# Patient Record
Sex: MALE | Race: WHITE | Employment: OTHER | URBAN - NONMETROPOLITAN AREA
[De-identification: names, ages, dates, MRNs, and addresses within clinical notes are randomized per-mention and may not be internally consistent; named-entity substitution may affect disease eponyms.]

---

## 2019-04-24 ENCOUNTER — HOSPITAL ENCOUNTER (OUTPATIENT)
Age: 69
Discharge: HOME | End: 2019-04-26
Payer: MEDICARE

## 2019-04-24 ENCOUNTER — HOSPITAL ENCOUNTER (OUTPATIENT)
Dept: GENERAL RADIOLOGY | Age: 69
Discharge: HOME | End: 2019-04-26
Payer: MEDICARE

## 2019-04-24 DIAGNOSIS — M25.562 LEFT KNEE PAIN, UNSPECIFIED CHRONICITY: ICD-10-CM

## 2019-04-24 PROCEDURE — 73562 X-RAY EXAM OF KNEE 3: CPT

## 2019-12-11 ENCOUNTER — HOSPITAL ENCOUNTER (OUTPATIENT)
Age: 69
Discharge: HOME | End: 2019-12-13
Payer: MEDICARE

## 2019-12-11 ENCOUNTER — HOSPITAL ENCOUNTER (OUTPATIENT)
Dept: GENERAL RADIOLOGY | Age: 69
Discharge: HOME | End: 2019-12-13
Payer: MEDICARE

## 2019-12-11 DIAGNOSIS — M25.562 LEFT KNEE PAIN, UNSPECIFIED CHRONICITY: ICD-10-CM

## 2019-12-11 PROCEDURE — 73564 X-RAY EXAM KNEE 4 OR MORE: CPT

## 2022-07-20 ENCOUNTER — HOSPITAL ENCOUNTER (OUTPATIENT)
Dept: GENERAL RADIOLOGY | Age: 72
Discharge: HOME | End: 2022-07-22
Payer: MEDICARE

## 2022-07-20 ENCOUNTER — HOSPITAL ENCOUNTER (OUTPATIENT)
Age: 72
Discharge: HOME | End: 2022-07-22
Payer: MEDICARE

## 2022-07-20 DIAGNOSIS — M17.12 OSTEOARTHRITIS OF LEFT KNEE, UNSPECIFIED OSTEOARTHRITIS TYPE: ICD-10-CM

## 2022-07-20 PROCEDURE — 73564 X-RAY EXAM KNEE 4 OR MORE: CPT

## 2022-10-07 ENCOUNTER — HOSPITAL ENCOUNTER (OUTPATIENT)
Dept: PHYSICAL THERAPY | Age: 72
Setting detail: THERAPIES SERIES
Discharge: HOME | End: 2022-10-07
Payer: MEDICARE

## 2022-10-07 PROCEDURE — 97110 THERAPEUTIC EXERCISES: CPT

## 2022-10-07 PROCEDURE — 97162 PT EVAL MOD COMPLEX 30 MIN: CPT

## 2022-10-07 NOTE — PLAN OF CARE
[facility]       Phone: [phone]   Date: 10/7/2022                      Outpatient Physical Therapy  Fax: [phone]    ACCT #: [de-identified]                     Plan of Care  [facility]#: [id number]  Patient: Kelsey Reyes  : 1950    Referring Provider (secondary): Dr Lit Saucedo    Diagnosis: L TKR  Onset Date: 22  Treatment Diagnosis: L knee pain s/p TKR    Assessment  Body Structures, Functions, Activity Limitations Requiring Skilled Therapeutic Intervention: Decreased functional mobility , Decreased ADL status, Decreased ROM, Decreased strength, Decreased endurance, Decreased balance, Increased pain, Decreased posture, Decreased high-level IADLs  Assessment: Pt to benefit from ther ex for ROM and LE strengthening, neuro re ed for balance training, as well as gait training. Pt AROM 4-98deg this date. Therapy Prognosis: Good    Treatment Plan   Days: 3 times per week Weeks: 6 weeks Total # of Visits Approved: 18    Patient Education/HEP, Therapeutic Exercise, Manual Therapy: Myofacial Release/Cupping, Manual Therapy: Mobilization/Manipulation, Neuro Re-ed, and Gait Training     Goals  Short Term Goals  Time Frame for Short Term Goals: 6 visits  Short Term Goal 1: Pt to report independence and compliance with HEP for ROM  Short Term Goal 2: Pt to have PROM knee ext 0deg to improve standing andrea. Long Term Goals  Time Frame for Long Term Goals : 18 visits  Long Term Goal 1: Pt to improve LEFS score from 33/80 to >43/80 to improve ADL andrea. Long Term Goal 2: Pt to have >110deg PROM to improve car transfers  Long Term Goal 3: Pt to amb >100ft without AD and no deviations to improve community amb andrea. Long Term Goal 4: Pt to have 4/5 Hip ABD strength to improve walking and standing andrea.      Renzo Huggins, PT   Date: 10/7/2022    ______________________________________ Date: 10/7/2022  Physician Signature  By signing above or cosigning electronically, I have reviewed this Plan of Care and certify a need for medically necessary rehabilitation services.

## 2022-10-07 NOTE — PROGRESS NOTES
Phone: [phone]         Fax: [phone]                      Outpatient Physical Therapy                                                                      Evaluation    Date: 10/7/2022  Patient: Zackery Chavez  : 1950  ACCT #: [de-identified]    Referring Provider (secondary): Dr Rafael Quiles    Diagnosis: L TKR    Treatment Diagnosis: L knee pain s/p TKR  Onset Date: 22  PT Insurance Information: [facility]  Total # of Visits Approved: 18 Per Physician Order  Total # of Visits to Date: 1  No Show: 0  Canceled Appointment: 0     Subjective  Additional Pertinent Hx: Pt reports undergoing L TKR on 22, same day surgery. No complications. Pt did have small opening at distal incision, he had called and talked to Dr. Perez Weber. Pt is on antibiotics x7day(2x/day) and today is 2nd day and reports wound is improving. Taking only extra strength tylenol, no issues with sleeping. 4/10 pain at worst.    Objective  Observation/Palpation  Posture: Fair  Observation: Incision areas visualized well healed, mild pitting edema. Strength LLE  L Hip Flexion: 4/5  L Hip Extension: 4/5  L Hip ABduction: 3+/5  L Knee Extension: 4-/5  L Ankle Dorsiflexion: 5/5  AROM LLE (degrees)  L Knee Flexion 0-145: 98deg  L Knee Extension 0: 4deg FN after quad set  L Ankle Dorsiflexion 0-20: 5deg     AROM LLE (degrees)  L Knee Flexion 0-145: 98deg  L Knee Extension 0: 4deg FN after quad set  L Ankle Dorsiflexion 0-20: 5deg     Assessment  Body Structures, Functions, Activity Limitations Requiring Skilled Therapeutic Intervention: Decreased functional mobility , Decreased ADL status, Decreased ROM, Decreased strength, Decreased endurance, Decreased balance, Increased pain, Decreased posture, Decreased high-level IADLs  Assessment: Pt to benefit from ther ex for ROM and LE strengthening, neuro re ed for balance training, as well as gait training. Pt AROM 4-98deg this date.   Therapy Prognosis: Good    Clinical Presentation:  Evolving  The Following Comorbities will impact the patients progression and Plan of Care:   Cardiac Disease/Pacemaker, Diabetes, and Previous Orthopedic Injury/Surgery  Medium Complexity    Education: POC; Therex - vc's for form with step taps to avoid circumduction    Goals  Short Term Goals  Time Frame for Short Term Goals: 6 visits  Short Term Goal 1: Pt to report independence and compliance with HEP for ROM  Short Term Goal 2: Pt to have PROM knee ext 0deg to improve standing andrea. Long Term Goals  Time Frame for Long Term Goals : 18 visits  Long Term Goal 1: Pt to improve LEFS score from 33/80 to >43/80 to improve ADL andrea. Long Term Goal 2: Pt to have >110deg PROM to improve car transfers  Long Term Goal 3: Pt to amb >100ft without AD and no deviations to improve community amb andrea. Long Term Goal 4: Pt to have 4/5 Hip ABD strength to improve walking and standing andrea.     Patient's Goal:  Patient Goals : Be able to walk without AD and without limp    Timed Code Treatment Minutes: 10 Minutes  Total Treatment Time: 50     Time In: 1450  Time Out: 3900 Bisi Dos Santos, PT Date: 10/7/2022

## 2022-10-11 ENCOUNTER — HOSPITAL ENCOUNTER (OUTPATIENT)
Dept: PHYSICAL THERAPY | Age: 72
Setting detail: THERAPIES SERIES
Discharge: HOME | End: 2022-10-11
Payer: MEDICARE

## 2022-10-11 PROCEDURE — 97110 THERAPEUTIC EXERCISES: CPT

## 2022-10-11 ASSESSMENT — PAIN SCALES - GENERAL: PAINLEVEL_OUTOF10: 2

## 2022-10-11 NOTE — PROGRESS NOTES
Treatment Pain:  1/10    Time In: 1105    Time Out : 1140        Timed Code Treatment Minutes: 35 Minutes  Total Treatment Time: [facility]    Date: 10/11/2022

## 2022-10-12 ENCOUNTER — HOSPITAL ENCOUNTER (OUTPATIENT)
Dept: PHYSICAL THERAPY | Age: 72
Setting detail: THERAPIES SERIES
Discharge: HOME | End: 2022-10-12
Payer: MEDICARE

## 2022-10-12 PROCEDURE — 97110 THERAPEUTIC EXERCISES: CPT

## 2022-10-12 NOTE — PROGRESS NOTES
Physical Therapy  Phone: [address]      Fax: [phone]                            Outpatient Physical Therapy                                                                            Daily Note    Date: 10/12/2022  Patient Name: Brittny Calle        MRN: [id number]   ACCT#:  [de-identified]  : 1950  (70 y.o.)    Referring Provider (secondary): Dr Inez Huang         Diagnosis: L TKR  Treatment Diagnosis: L knee pain s/p TKR    Onset Date: 22  PT Insurance Information: [facility]  Total # of Visits Approved: 18 Per Physician Order  Total # of Visits to Date: 3  No Show: 0  Canceled Appointment: 0    Pre-Treatment Pain:  0/10     Assessment  Assessment: Patient denies pain today and states he took 2 extra strength tylenol prior to session. Minimal drainage today on bandage. States he has an appointment with  this afternoon. Continued with exercises as outlined above with good tolerance. Plan  Continue with current plan of care    Exercises/Modalities/Manual:  See DocFlow Sheet    Education:           Goals  (Total # of Visits to Date: 3)   Short Term Goals  Time Frame for Short Term Goals: 6 visits  Short Term Goal 1: Pt to report independence and compliance with HEP for ROM-MET  Short Term Goal 2: Pt to have PROM knee ext 0deg to improve standing andrea. -MET    Long Term Goals  Time Frame for Long Term Goals : 18 visits  Long Term Goal 1: Pt to improve LEFS score from 33/80 to >43/80 to improve ADL andrea. Long Term Goal 2: Pt to have >110deg PROM to improve car transfers  Long Term Goal 3: Pt to amb >100ft without AD and no deviations to improve community amb andrea. Long Term Goal 4: Pt to have 4/5 Hip ABD strength to improve walking and standing andrea.     Post Treatment Pain:  1/10    Time In: 1115    Time Out : 1150        Timed Code Treatment Minutes: 35 Minutes  Total Treatment Time: RAMSES Rosales    Date: 10/12/2022

## 2022-10-14 ENCOUNTER — HOSPITAL ENCOUNTER (OUTPATIENT)
Dept: PHYSICAL THERAPY | Age: 72
Setting detail: THERAPIES SERIES
Discharge: HOME | End: 2022-10-14
Payer: MEDICARE

## 2022-10-14 PROCEDURE — 97110 THERAPEUTIC EXERCISES: CPT

## 2022-10-17 ENCOUNTER — HOSPITAL ENCOUNTER (OUTPATIENT)
Dept: PHYSICAL THERAPY | Age: 72
Setting detail: THERAPIES SERIES
Discharge: HOME | End: 2022-10-17
Payer: MEDICARE

## 2022-10-17 PROCEDURE — 97110 THERAPEUTIC EXERCISES: CPT

## 2022-10-17 NOTE — PROGRESS NOTES
Phone: [address]      Fax: [phone]                            Outpatient Physical Therapy                                                                            Daily Note    Date: 10/17/2022  Patient Name: Layla Stern        MRN: [id number]   ACCT#:  [de-identified]  : 1950  (70 y.o.)    Referring Provider (secondary): Dr Radha James         Diagnosis: L TKR  Treatment Diagnosis: L knee pain s/p TKR    Onset Date: 22  PT Insurance Information: [facility]  Total # of Visits Approved: 18 Per Physician Order  Total # of Visits to Date: 5  No Show: 0  Canceled Appointment: 0    Pre-Treatment Pain:  0/10     Assessment  Assessment: Pt arrives reporting 0/10 pain. Pt completed exercises per log with good understanding. Pt continues to use SC with ambulation demonstrating a circumduction pattern. Wound is looking good, having no seeping and healthy tissue. AROM knee flexion = 97 degrees. Plan  Continue with current plan of care    Exercises/Modalities/Manual:  See DocFlow Sheet    Education:           Goals  (Total # of Visits to Date: 5)   Short Term Goals  Time Frame for Short Term Goals: 6 visits  Short Term Goal 1: Pt to report independence and compliance with HEP for ROM-MET  Short Term Goal 2: Pt to have PROM knee ext 0deg to improve standing andrea. -MET    Long Term Goals  Time Frame for Long Term Goals : 18 visits  Long Term Goal 1: Pt to improve LEFS score from 33/80 to >43/80 to improve ADL andrea. Long Term Goal 2: Pt to have >110deg PROM to improve car transfers  LTG Goal 2 Status[de-identified] Met  Long Term Goal 3: Pt to amb >100ft without AD and no deviations to improve community amb andrea. Long Term Goal 4: Pt to have 4/5 Hip ABD strength to improve walking and standing andrea.     Post Treatment Pain:  0/10      Time In: 1119  Time Out: 1150  Timed Code Treatment Minutes: 31 Minutes  Total Treatment Time: 32 Minutes    Deshawn Ya PTA     Date: 10/17/2022

## 2022-10-19 ENCOUNTER — HOSPITAL ENCOUNTER (OUTPATIENT)
Dept: PHYSICAL THERAPY | Age: 72
Setting detail: THERAPIES SERIES
Discharge: HOME | End: 2022-10-19
Payer: MEDICARE

## 2022-10-19 PROCEDURE — 97110 THERAPEUTIC EXERCISES: CPT

## 2022-10-19 NOTE — PROGRESS NOTES
Phone: [address]      Fax: [phone]                            Outpatient Physical Therapy                                                                            Daily Note    Date: 10/19/2022  Patient Name: Gian Kirk        MRN: [id number]   ACCT#:  [de-identified]  : 1950  (70 y.o.)    Referring Provider (secondary): Dr Tiana Vela         Diagnosis: L TKR  Treatment Diagnosis: L knee pain s/p TKR    Onset Date: 22  PT Insurance Information: [facility]  Total # of Visits Approved: 18 Per Physician Order  Total # of Visits to Date: 6  No Show: 0  Canceled Appointment: 0    Pre-Treatment Pain:  0/10     Assessment  Assessment: Pt arrives reporting 0/10 pain. Pt completed exercises per log without any increased pain. Post stretching PROM knee flexion = 103 degrees. Plan to progress per pt tolerance as pt fatigues easily. Plan  Continue with current plan of care    Exercises/Modalities/Manual:  See DocFlow Sheet    Education:           Goals  (Total # of Visits to Date: 6)   Short Term Goals  Time Frame for Short Term Goals: 6 visits  Short Term Goal 1: Pt to report independence and compliance with HEP for ROM-MET  Short Term Goal 2: Pt to have PROM knee ext 0deg to improve standing andrea. -MET    Long Term Goals  Time Frame for Long Term Goals : 18 visits  Long Term Goal 1: Pt to improve LEFS score from 33/80 to >43/80 to improve ADL andrea. Long Term Goal 2: Pt to have >110deg PROM to improve car transfers  LTG Goal 2 Status[de-identified] Met  Long Term Goal 3: Pt to amb >100ft without AD and no deviations to improve community amb andrea. Long Term Goal 4: Pt to have 4/5 Hip ABD strength to improve walking and standing andrea.     Post Treatment Pain:  0/10      Time In: 1120  Time Out: 1155  Timed Code Treatment Minutes: 31 Minutes  Total Treatment Time: 31 Minutes    Greg Alston PTA     Date: 10/19/2022

## 2022-10-21 ENCOUNTER — HOSPITAL ENCOUNTER (OUTPATIENT)
Dept: PHYSICAL THERAPY | Age: 72
Setting detail: THERAPIES SERIES
Discharge: HOME | End: 2022-10-21
Payer: MEDICARE

## 2022-10-21 PROCEDURE — 97110 THERAPEUTIC EXERCISES: CPT

## 2022-10-21 PROCEDURE — 97112 NEUROMUSCULAR REEDUCATION: CPT

## 2022-10-21 NOTE — OP NOTE
[facility] RAEGAN KNOWLES          Phone: [phone]      Outpatient Physical Therapy  Fax: [phone]                                 10th Visit Report    Date: 10/21/2022  ACCT #: [de-identified]      Referring Provider (secondary): Dr Jocelyn Martins         Diagnosis: L TKR      Treatment Diagnosis: L knee pain s/p TKR    Onset Date: 09/26/22  PT Insurance Information: [facility]  Total # of Visits Approved: 18 Per Physician Order  Total # of Visits to Date: 7  No Show: 0  Canceled Appointment: 0    Current Treatment:  Patient Education/HEP, Back Education, Therapeutic Exercise, Manual Therapy: Myofacial Release/Cupping, Neuro Re-ed, and Gait Training    Skilled Intervention:  Body Structures, Functions, Activity Limitations Requiring Skilled Therapeutic Intervention: Decreased functional mobility , Decreased ADL status, Decreased ROM, Decreased strength, Decreased endurance, Decreased balance, Increased pain, Decreased posture, Decreased high-level IADLs  Assessment: Pt amb with SC this date no sig deviations, continues to use SC in community but not in the home. PROM knee [id number], AAROM=5deg  Therapy Prognosis: Good  Reduce Falls   , Improve Ambulation, Complete Daily Tasks Safely, and Return to Prior Level of Function    Expectations for Improvement/Time Frame:  Cont x 4 wks toward LTGs    Plan  Continue with current plan of care    Goals  Short Term Goals  Time Frame for Short Term Goals: 6 visits  Short Term Goal 1: Pt to report independence and compliance with HEP for ROM-MET  Short Term Goal 2: Pt to have PROM knee ext 0deg to improve standing andrea. -MET    Long Term Goals  Time Frame for Long Term Goals : 18 visits  Long Term Goal 1: Pt to improve LEFS score from 33/80 to >43/80 to improve ADL andrea.   Long Term Goal 2: Pt to have >110deg PROM to improve car transfers  Long Term Goal 3: Pt to amb >100ft without AD and no deviations to improve community amb andrea.  Long Term Goal 4: Pt to have 4/5 Hip ABD strength to improve walking and standing andrea.     Nicholas Lawler, PT      Date: 10/21/2022

## 2022-10-21 NOTE — PROGRESS NOTES
Phone: [phone]      Fax: [phone]                            Outpatient Physical Therapy                                                                            Daily Note    Date: 10/21/2022  Patient Name: Layla Stern        MRN: [id number]   ACCT#:  [de-identified]  : 1950  (70 y.o.)    Referring Provider (secondary): Dr Radha James         Diagnosis: L TKR  Treatment Diagnosis: L knee pain s/p TKR    Onset Date: 22  PT Insurance Information: [facility]  Total # of Visits Approved: 18 Per Physician Order  Total # of Visits to Date: 7  No Show: 0  Canceled Appointment: 0    Pre-Treatment Pain:  0/10     Assessment  Assessment: Pt amb with SC this date no sig deviations, continues to use SC in community but not in the home. PROM knee [id number], AAROM=5deg    Plan  Continue with current plan of care    Exercises/Modalities/Manual:  See DocFlow Sheet    Education: Still note very slight drainage post exercise due to pt pant leg with wet spot size ~3mm          Goals  (Total # of Visits to Date: 7)   Short Term Goals  Time Frame for Short Term Goals: 6 visits  Short Term Goal 1: Pt to report independence and compliance with HEP for ROM-MET  Short Term Goal 2: Pt to have PROM knee ext 0deg to improve standing andrea. -MET    Long Term Goals  Time Frame for Long Term Goals : 18 visits  Long Term Goal 1: Pt to improve LEFS score from 33/80 to >43/80 to improve ADL andrea. Long Term Goal 2: Pt to have >110deg PROM to improve car transfers  Long Term Goal 3: Pt to amb >100ft without AD and no deviations to improve community amb andrea. Long Term Goal 4: Pt to have 4/5 Hip ABD strength to improve walking and standing andrea.     Post Treatment Pain:  0/10    Time In: 1120    Time Out : 1158  Timed Code Treatment Minutes: 38 Minutes  Total Treatment Time: CASH Correa     Date: 10/21/2022

## 2022-10-24 ENCOUNTER — HOSPITAL ENCOUNTER (OUTPATIENT)
Dept: PHYSICAL THERAPY | Age: 72
Setting detail: THERAPIES SERIES
Discharge: HOME | End: 2022-10-24
Payer: MEDICARE

## 2022-10-24 PROCEDURE — 97112 NEUROMUSCULAR REEDUCATION: CPT

## 2022-10-24 PROCEDURE — 97110 THERAPEUTIC EXERCISES: CPT

## 2022-10-24 NOTE — PROGRESS NOTES
Phone: [phone]      Fax: [phone]                            Outpatient Physical Therapy                                                                            Daily Note    Date: 10/24/2022  Patient Name: Emely Rosado        MRN: [id number]   ACCT#:  [de-identified]  : 1950  (70 y.o.)    Referring Provider (secondary): Dr Antonio Durand         Diagnosis: L TKR  Treatment Diagnosis: L knee pain s/p TKR    Onset Date: 22  PT Insurance Information: [facility]  Total # of Visits Approved: 18 Per Physician Order  Total # of Visits to Date: 8  No Show: 0  Canceled Appointment: 0    Pre-Treatment Pain:  0/10     Assessment  Assessment: Pt arrived reporting being pain free. Pt continues to ambulate using SC while outside of home. PROM knee flexion = 108 degrees. Plan to continue with strengthening and ROM. Plan  Continue with current plan of care    Exercises/Modalities/Manual:  See DocFlow Sheet    Education:           Goals  (Total # of Visits to Date: 8)   Short Term Goals  Time Frame for Short Term Goals: 6 visits  Short Term Goal 1: Pt to report independence and compliance with HEP for ROM-MET  STG Goal 1 Status[de-identified] Met  Short Term Goal 2: Pt to have PROM knee ext 0deg to improve standing andrea. -MET  STG Goal 2 Status[de-identified] Met    Long Term Goals  Time Frame for Long Term Goals : 18 visits  Long Term Goal 1: Pt to improve LEFS score from 33/80 to >43/80 to improve ADL andrea. Long Term Goal 2: Pt to have >110deg PROM to improve car transfers  Long Term Goal 3: Pt to amb >100ft without AD and no deviations to improve community amb andrea. Long Term Goal 4: Pt to have 4/5 Hip ABD strength to improve walking and standing andrea.     Post Treatment Pain:  0/10      Time In: 1303  Time Out: 1340  Timed Code Treatment Minutes: 37 Minutes  Total Treatment Time: 40 Minutes    Mook Mason, PTA     Date: 10/24/2022

## 2022-10-26 ENCOUNTER — HOSPITAL ENCOUNTER (OUTPATIENT)
Dept: PHYSICAL THERAPY | Age: 72
Setting detail: THERAPIES SERIES
Discharge: HOME | End: 2022-10-26
Payer: MEDICARE

## 2022-10-26 PROCEDURE — 97112 NEUROMUSCULAR REEDUCATION: CPT

## 2022-10-26 PROCEDURE — 97110 THERAPEUTIC EXERCISES: CPT

## 2022-10-26 NOTE — PROGRESS NOTES
Phone: [phone]            Fax: [phone]                             Outpatient Physical Therapy                                                                      Progress Report    Date: 10/26/2022   MRN: [id number]    ACCT#: [de-identified]  Patient: Evangelina Mead  : 1950  Referring Provider (secondary): Dr Olga Pak         Diagnosis: L TKR      Treatment Diagnosis: L knee pain s/p TKR    Onset Date: 22  PT Insurance Information: [facility]  Total # of Visits Approved: 18 Per Physician Order  Total # of Visits to Date: 9  No Show: 0  Canceled Appointment: 0    Assessment  Patient is compliant with appointments and HEP issued him. PROM knee extension = 0 degrees, PROM flexion = 109 degrees. Patient continue to ambulate with SC with deviations. Plan to continue with ROM and strengthening. Therapy Prognosis: Good    Plan  Continue with current plan of care    Goals  Short Term Goals  Time Frame for Short Term Goals: 6 visits  Short Term Goal 1: Pt to report independence and compliance with HEP for ROM-MET  Short Term Goal 2: Pt to have PROM knee ext 0deg to improve standing andrea. -MET    Long Term Goals  Time Frame for Long Term Goals : 18 visits  Long Term Goal 1: Pt to improve LEFS score from 33/80 to >43/80 to improve ADL andrea. Long Term Goal 2: Pt to have >110deg PROM to improve car transfers  Long Term Goal 3: Pt to amb >100ft without AD and no deviations to improve community amb andrea. Long Term Goal 4: Pt to have 4/5 Hip ABD strength to improve walking and standing andrea.     Mello Burns, PTA    Date: 10/26/2022

## 2022-10-26 NOTE — PROGRESS NOTES
Phone: [phone]      Fax: [phone]                            Outpatient Physical Therapy                                                                            Daily Note    Date: 10/26/2022  Patient Name: Emely Poster        MRN: [id number]   ACCT#:  [de-identified]  : 1950  (70 y.o.)    Referring Provider (secondary): Dr Antonio Durand         Diagnosis: L TKR  Treatment Diagnosis: L knee pain s/p TKR    Onset Date: 22  PT Insurance Information: [facility]  Total # of Visits Approved: 18 Per Physician Order  Total # of Visits to Date: 9  No Show: 0  Canceled Appointment: 0    Pre-Treatment Pain:  0/10     Assessment  Assessment: Pt arrived reporting 0/10 pain. Pt completed exercises per log with good tolerance. PROM knee 0 degrees to 109 degrees. Plan to continue with current POC. Plan  Continue with current plan of care    Exercises/Modalities/Manual:  See DocFlow Sheet    Education:           Goals  (Total # of Visits to Date: 5)   Short Term Goals  Time Frame for Short Term Goals: 6 visits  Short Term Goal 1: Pt to report independence and compliance with HEP for ROM-MET  STG Goal 1 Status[de-identified] Met  Short Term Goal 2: Pt to have PROM knee ext 0deg to improve standing andrea. -MET  STG Goal 2 Status[de-identified] Met    Long Term Goals  Time Frame for Long Term Goals : 18 visits  Long Term Goal 1: Pt to improve LEFS score from 33/80 to >43/80 to improve ADL andrea. Long Term Goal 2: Pt to have >110deg PROM to improve car transfers  Long Term Goal 3: Pt to amb >100ft without AD and no deviations to improve community amb andrea. Long Term Goal 4: Pt to have 4/5 Hip ABD strength to improve walking and standing andrea.     Post Treatment Pain:  1/10      Time In: 1115  Time Out: 1150  Timed Code Treatment Minutes: 35 Minutes  Total Treatment Time: [address] [employer] [address] Minutes    Mook Mason PTA     Date: 10/26/2022

## 2022-10-28 ENCOUNTER — HOSPITAL ENCOUNTER (OUTPATIENT)
Dept: PHYSICAL THERAPY | Age: 72
Setting detail: THERAPIES SERIES
Discharge: HOME | End: 2022-10-28
Payer: MEDICARE

## 2022-10-28 PROCEDURE — 97110 THERAPEUTIC EXERCISES: CPT

## 2022-10-28 NOTE — PROGRESS NOTES
Phone: [phone]      Fax: [phone]                            Outpatient Physical Therapy                                                                            Daily Note    Date: 10/28/2022  Patient Name: Dwight Garcia        MRN: [id number]   ACCT#:  [de-identified]  : 1950  (70 y.o.)    Referring Provider (secondary): Dr Jackie Romano         Diagnosis: L TKR  Treatment Diagnosis: L knee pain s/p TKR    Onset Date: 22  PT Insurance Information: [facility]  Total # of Visits Approved: 18 Per Physician Order  Total # of Visits to Date: 10  No Show: 0  Canceled Appointment: 0    Pre-Treatment Pain:  0/10     Assessment  Assessment: Pt continues to report 0/10 pain. Pt states physician happy with his progress. Pt continued with exercises with some progression tolerating well. PROM knee flexion = 110 degrees. Plan to continue with POC. Plan  Continue with current plan of care    Exercises/Modalities/Manual:  See DocFlow Sheet    Education:           Goals  (Total # of Visits to Date: 8)   Short Term Goals  Time Frame for Short Term Goals: 6 visits  Short Term Goal 1: Pt to report independence and compliance with HEP for ROM-MET  STG Goal 1 Status[de-identified] Met  Short Term Goal 2: Pt to have PROM knee ext 0deg to improve standing andrea. -MET  STG Goal 2 Status[de-identified] Met    Long Term Goals  Time Frame for Long Term Goals : 18 visits  Long Term Goal 1: Pt to improve LEFS score from 33/80 to >43/80 to improve ADL andrea. Long Term Goal 2: Pt to have >110deg PROM to improve car transfers  Long Term Goal 3: Pt to amb >100ft without AD and no deviations to improve community amb andrea. Long Term Goal 4: Pt to have 4/5 Hip ABD strength to improve walking and standing andrea.     Post Treatment Pain:  0/10      Time In: 1116  Time Out: 1155  Timed Code Treatment Minutes: 39 Minutes  Total Treatment Time: 44 Minutes    Oli Quezada PTA     Date: 10/28/2022

## 2022-10-31 ENCOUNTER — HOSPITAL ENCOUNTER (OUTPATIENT)
Dept: PHYSICAL THERAPY | Age: 72
Setting detail: THERAPIES SERIES
Discharge: HOME | End: 2022-10-31
Payer: MEDICARE

## 2022-10-31 PROCEDURE — 97112 NEUROMUSCULAR REEDUCATION: CPT

## 2022-10-31 PROCEDURE — 97110 THERAPEUTIC EXERCISES: CPT

## 2022-10-31 NOTE — PROGRESS NOTES
Phone: Adrian Haro      Fax: [phone]                            Outpatient Physical Therapy                                                                            Daily Note    Date: 10/31/2022  Patient Name: Jerardo Santana        MRN: [id number]   ACCT#:  [de-identified]  : 1950  (70 y.o.)    Referring Provider (secondary): Dr Romain Johnson         Diagnosis: L TKR  Treatment Diagnosis: L knee pain s/p TKR    Onset Date: 22  PT Insurance Information: [facility]  Total # of Visits Approved: 18 Per Physician Order  Total # of Visits to Date: 11  No Show: 0  Canceled Appointment: 0    Pre-Treatment Pain:  0/10     Assessment  Assessment: Pt enters facility reporting no pain. Pt states not using cane for ambulation all day yeterday. Pt demonstrated ambulating in clinic ambulating with minimal deviations, waddle. PROM knee flexion = 111 degrees. Plan to continue with current POC. Plan  Continue with current plan of care    Exercises/Modalities/Manual:  See DocFlow Sheet    Education:           Goals  (Total # of Visits to Date: 6)   Short Term Goals  Time Frame for Short Term Goals: 6 visits  Short Term Goal 1: Pt to report independence and compliance with HEP for ROM-MET  STG Goal 1 Status[de-identified] Met  Short Term Goal 2: Pt to have PROM knee ext 0deg to improve standing andrea. -MET  STG Goal 2 Status[de-identified] Met    Long Term Goals  Time Frame for Long Term Goals : 18 visits  Long Term Goal 1: Pt to improve LEFS score from 33/80 to >43/80 to improve ADL andrea. Long Term Goal 2: Pt to have >110deg PROM to improve car transfers  LTG Goal 2 Status[de-identified] Met  Long Term Goal 3: Pt to amb >100ft without AD and no deviations to improve community amb andrea. Long Term Goal 4: Pt to have 4/5 Hip ABD strength to improve walking and standing andrea.     Post Treatment Pain:  0/10      Time In: 1244  Time Out: 1320  Timed Code Treatment Minutes: 36Minutes  Total Treatment Time: 36Minutes    Haydee MOY Tania Ny, PTA     Date: 10/31/2022

## 2022-11-02 ENCOUNTER — HOSPITAL ENCOUNTER (OUTPATIENT)
Dept: PHYSICAL THERAPY | Age: 72
Setting detail: THERAPIES SERIES
Discharge: HOME | End: 2022-11-02
Payer: MEDICARE

## 2022-11-02 PROCEDURE — 97112 NEUROMUSCULAR REEDUCATION: CPT

## 2022-11-02 PROCEDURE — 97110 THERAPEUTIC EXERCISES: CPT

## 2022-11-02 NOTE — PROGRESS NOTES
Physical Therapy  Phone: Adrian Haro      Fax: [phone]                            Outpatient Physical Therapy                                                                            Daily Note    Date: 2022  Patient Name: Dwight Garcia        MRN: [id number]   ACCT#:  [de-identified]  : 1950  (70 y.o.)    Referring Provider (secondary): Dr Jackie Romano         Diagnosis: L TKR  Treatment Diagnosis: L knee pain s/p TKR    Onset Date: 22  PT Insurance Information: [facility]  Total # of Visits Approved: 18 Per Physician Order  Total # of Visits to Date: 12  No Show: 0  Canceled Appointment: 0    Pre-Treatment Pain:  0/10     Assessment  Assessment: Patient denies pain tody prior to session. Brings cane with hime but states he does not use it much at home and thought about leaving it in the car. Gait has minimal deviations without device. Completes exercises as outlined above. PROM L knee flexin is 112 degrees. Plan  Continue with current plan of care    Exercises/Modalities/Manual:  See DocFlow Sheet    Education:           Goals  (Total # of Visits to Date: 15)     Short Term Goals  Time Frame for Short Term Goals: 6 visits  Short Term Goal 1: Pt to report independence and compliance with HEP for ROM-MET  STG Goal 1 Status[de-identified] Met  Short Term Goal 2: Pt to have PROM knee ext 0deg to improve standing andrea. -MET  STG Goal 2 Status[de-identified] Met    Long Term Goals  Time Frame for Long Term Goals : 18 visits  Long Term Goal 1: Pt to improve LEFS score from 33/80 to >43/80 to improve ADL andrea. Long Term Goal 2: Pt to have >110deg PROM to improve car transfers  LTG Goal 2 Status[de-identified] Met  Long Term Goal 3: Pt to amb >100ft without AD and no deviations to improve community amb andrea. Long Term Goal 4: Pt to have 4/5 Hip ABD strength to improve walking and standing andrea.     Post Treatment Pain:  0/10    Time In: 1114    Time Out : 1153        Timed Code Treatment Minutes: 39 Minutes  Total Treatment Time: Rupinder 50 RAMSES Rodriguez     Date: 11/2/2022

## 2022-11-04 ENCOUNTER — HOSPITAL ENCOUNTER (OUTPATIENT)
Dept: PHYSICAL THERAPY | Age: 72
Setting detail: THERAPIES SERIES
Discharge: HOME | End: 2022-11-04
Payer: MEDICARE

## 2022-11-04 PROCEDURE — 97110 THERAPEUTIC EXERCISES: CPT

## 2022-11-04 NOTE — PROGRESS NOTES
Phone: Adrian Haro      Fax: [phone]                            Outpatient Physical Therapy                                                                            Daily Note    Date: 2022  Patient Name: Layla Stern        MRN: [id number]   ACCT#:  [de-identified]  : 1950  (70 y.o.)    Referring Provider (secondary): Dr Radha James         Diagnosis: L TKR  Treatment Diagnosis: L knee pain s/p TKR    Onset Date: 22  PT Insurance Information: [facility]  Total # of Visits Approved: 18 Per Physician Order  Total # of Visits to Date: 13  No Show: 0  Canceled Appointment: 0    Pre-Treatment Pain:  0/10     Assessment  Assessment: Pt arrives reporting 0/10 pain. Pt enters facility wihout assistive device demonstrating mild deviation. Pt reports improvement with discomfort at night time with pain less severe sooner than before. Hip ABD strength = 5/5. Plan to continue with current POC. Plan  Continue with current plan of care    Exercises/Modalities/Manual:  See DocFlow Sheet    Education:           Goals  (Total # of Visits to Date: 15)   Short Term Goals  Time Frame for Short Term Goals: 6 visits  Short Term Goal 1: Pt to report independence and compliance with HEP for ROM-MET  STG Goal 1 Status[de-identified] Met  Short Term Goal 2: Pt to have PROM knee ext 0deg to improve standing andrea. -MET  STG Goal 2 Status[de-identified] Met    Long Term Goals  Time Frame for Long Term Goals : 18 visits  Long Term Goal 1: Pt to improve LEFS score from 33/80 to >43/80 to improve ADL andrea. Long Term Goal 2: Pt to have >110deg PROM to improve car transfers  LTG Goal 2 Status[de-identified] Met  Long Term Goal 3: Pt to amb >100ft without AD and no deviations to improve community amb andrea. Long Term Goal 4: Pt to have 4/5 Hip ABD strength to improve walking and standing andrea.   LTG Goal 4 Status[de-identified] Met    Post Treatment Pain:  /10      Time In: 5816  Time Out: 1155  Timed Code Treatment Minutes: 41 Minutes  Total Treatment Time: 41Minutes    Phil Bourgeois, PTA     Date: 11/4/2022

## 2022-11-07 ENCOUNTER — HOSPITAL ENCOUNTER (OUTPATIENT)
Dept: PHYSICAL THERAPY | Age: 72
Setting detail: THERAPIES SERIES
Discharge: HOME | End: 2022-11-07
Payer: MEDICARE

## 2022-11-07 PROCEDURE — 97112 NEUROMUSCULAR REEDUCATION: CPT

## 2022-11-07 PROCEDURE — 97110 THERAPEUTIC EXERCISES: CPT

## 2022-11-07 NOTE — PROGRESS NOTES
Phone: Adrian Haro      Fax: [phone]                            Outpatient Physical Therapy                                                                            Daily Note    Date: 2022  Patient Name: Alondra Parmar        MRN: [id number]   ACCT#:  [de-identified]  : 1950  (70 y.o.)    Referring Provider (secondary): Dr Carmen Donald         Diagnosis: L TKR  Treatment Diagnosis: L knee pain s/p TKR    Onset Date: 22  PT Insurance Information: [facility]  Total # of Visits Approved: 18 Per Physician Order  Total # of Visits to Date: 14  No Show: 0  Canceled Appointment: 0    Pre-Treatment Pain:  0/10     Assessment  Assessment: Patient arrived without c/o pain. Pt ambulating with distinctive waddle this session with pt admitting he has been more active. Pt continues to ambulate without assistive device at all times. AROM knee flexion = 103 degrees. Plan to continue with strengthening and gait training. Plan  Continue with current plan of care    Exercises/Modalities/Manual:  See DocFlow Sheet    Education:           Goals  (Total # of Visits to Date: 15)   Short Term Goals  Time Frame for Short Term Goals: 6 visits  Short Term Goal 1: Pt to report independence and compliance with HEP for ROM-MET  STG Goal 1 Status[de-identified] Met  Short Term Goal 2: Pt to have PROM knee ext 0deg to improve standing andrea. -MET  STG Goal 2 Status[de-identified] Met    Long Term Goals  Time Frame for Long Term Goals : 18 visits  Long Term Goal 1: Pt to improve LEFS score from 33/80 to >43/80 to improve ADL andrea. Long Term Goal 2: Pt to have >110deg PROM to improve car transfers  LTG Goal 2 Status[de-identified] Met  Long Term Goal 3: Pt to amb >100ft without AD and no deviations to improve community amb andrea. Long Term Goal 4: Pt to have 4/5 Hip ABD strength to improve walking and standing andrea.   LTG Goal 4 Status[de-identified] Met    Post Treatment Pain:  0/10      Time In: 5016  Time Out: 1327  Timed Code Treatment Minutes: 42 Minutes  Total Treatment Time: 42Minutes    Greg Alston, RAMSES     Date: 11/7/2022

## 2022-11-09 ENCOUNTER — HOSPITAL ENCOUNTER (OUTPATIENT)
Dept: PHYSICAL THERAPY | Age: 72
Setting detail: THERAPIES SERIES
Discharge: HOME | End: 2022-11-09
Payer: MEDICARE

## 2022-11-09 PROCEDURE — 97110 THERAPEUTIC EXERCISES: CPT

## 2022-11-09 PROCEDURE — 97112 NEUROMUSCULAR REEDUCATION: CPT

## 2022-11-09 PROCEDURE — 97116 GAIT TRAINING THERAPY: CPT

## 2022-11-09 NOTE — PROGRESS NOTES
Phone: Adrian Haro      Fax: [phone]                            Outpatient Physical Therapy                                                                            Daily Note    Date: 2022  Patient Name: Tess Weinberg        MRN: [id number]   ACCT#:  [de-identified]  : 1950  (70 y.o.)    Referring Provider (secondary): Dr Fran Napoles         Diagnosis: L TKR  Treatment Diagnosis: L knee pain s/p TKR    Onset Date: 22  PT Insurance Information: [facility]  Total # of Visits Approved: 18 Per Physician Order  Total # of Visits to Date: 15  No Show: 0  Canceled Appointment: 0  Plan of Care/Certification Expiration Date: 22    Pre-Treatment Pain:  0/10     Assessment  Assessment: Patient arrives pain free. Pt continues to ambulate with a waddle. It was noticed pt does not arm swing with gait pattern. Worked on arm swing and heel strike toe off with pt having difficulty with concept of arm swing progression. When pt ambulates with heel toe offf and arm swing gait pattern improves. PROM knee flexion = 113 degrees. Plan  Continue with current plan of care    Exercises/Modalities/Manual:  See DocFlow Sheet    Education:           Goals  (Total # of Visits to Date: 13)   Short Term Goals  Time Frame for Short Term Goals: 6 visits  Short Term Goal 1: Pt to report independence and compliance with HEP for ROM-MET  STG Goal 1 Status[de-identified] Met  Short Term Goal 2: Pt to have PROM knee ext 0deg to improve standing andrea. -MET  STG Goal 2 Status[de-identified] Met    Long Term Goals  Time Frame for Long Term Goals : 18 visits  Long Term Goal 1: Pt to improve LEFS score from 33/80 to >43/80 to improve ADL andrea. Long Term Goal 2: Pt to have >110deg PROM to improve car transfers  LTG Goal 2 Status[de-identified] Met  Long Term Goal 3: Pt to amb >100ft without AD and no deviations to improve community amb andrea. Long Term Goal 4: Pt to have 4/5 Hip ABD strength to improve walking and standing andrea.   LTG Goal 4 Status[de-identified] Met    Post Treatment Pain:  0/10        Time In: 1069  Time Out: 1327  Timed Code Treatment Minutes: 42 Minutes  Total Treatment Time: 43 Minutes    Lam Nicholson PTA     Date: 11/9/2022

## 2022-11-11 ENCOUNTER — HOSPITAL ENCOUNTER (OUTPATIENT)
Dept: PHYSICAL THERAPY | Age: 72
Setting detail: THERAPIES SERIES
Discharge: HOME | End: 2022-11-11
Payer: MEDICARE

## 2022-11-11 PROCEDURE — 97110 THERAPEUTIC EXERCISES: CPT

## 2022-11-11 PROCEDURE — 97112 NEUROMUSCULAR REEDUCATION: CPT

## 2022-11-11 NOTE — DISCHARGE SUMMARY
Phone: [address]             Fax: [phone]                            Outpatient Physical Therapy                                                                    Discharge Summary    Patient: Shama Diaz  : 1950  ACCT #: [de-identified]   Referring Provider (secondary): Dr Allen Rogers      Diagnosis: L TKR    Date Treatment Initiated: 10/7/22  Date of Last Treatment: 22    PT Visit Information  Onset Date: 22  PT Insurance Information: [facility]  Total # of Visits Approved: 18  Total # of Visits to Date: 16  Plan of Care/Certification Expiration Date: 22  No Show: 0  Progress Note Due Date: 10/27/22  Canceled Appointment: 0    Frequency/Duration  Days: 3 times per week  Weeks: 6 weeks    Treatment Received  Patient Education/HEP, Therapeutic Exercise, Manual Therapy: Myofacial Release/Cupping, Neuro Re-ed, and Gait Training         Assessment  Assessment: Pt met all goals. Pt d/c this date to HEP. LEFS=74/80. AROM 0-110deg. Amb without deviations this date without AD. Reason for Discharge  Goals Met    Comments:   Thank you for this referral      Yuri Sexton, PT  Date: 2022

## 2022-11-11 NOTE — PROGRESS NOTES
Phone: Adrian Haro      Fax: [phone]                            Outpatient Physical Therapy                                                                            Daily Note    Date: 2022  Patient Name: Suhail Nunez        MRN: [id number]   ACCT#:  [de-identified]  : 1950  (70 y.o.)    Referring Provider (secondary): Dr Sarah Portillo         Diagnosis: L TKR  Treatment Diagnosis: L knee pain s/p TKR    Onset Date: 22  PT Insurance Information: [facility]  Total # of Visits Approved: 18 Per Physician Order  Total # of Visits to Date: 16  No Show: 0  Canceled Appointment: 0  Plan of Care/Certification Expiration Date: 22    Pre-Treatment Pain:  0/10     Assessment  Assessment: Pt met all goals. Pt d/c this date to HEP. LEFS=74/80. AROM 0-110deg. Amb without deviations this date without AD. Plan  Discharge    Exercises/Modalities/Manual:  See DocFlow Sheet    Education: D/C to HEP at this time          Goals  (Total # of Visits to Date: 12)   Short Term Goals  Time Frame for Short Term Goals: 6 visits  Short Term Goal 1: Pt to report independence and compliance with HEP for ROM-MET  STG Goal 1 Status[de-identified] Met  Short Term Goal 2: Pt to have PROM knee ext 0deg to improve standing andrea. -MET  STG Goal 2 Status[de-identified] Met    Long Term Goals  Time Frame for Long Term Goals : 18 visits  Long Term Goal 1: Pt to improve LEFS score from 33/80 to >43/80 to improve ADL andrea. LTG Goal 1 Status[de-identified] Met  Long Term Goal 2: Pt to have >110deg PROM to improve car transfers  LTG Goal 2 Status[de-identified] Met  Long Term Goal 3: Pt to amb >100ft without AD and no deviations to improve community amb andrea. LTG Goal 3 Status[de-identified] Met  Long Term Goal 4: Pt to have 4/5 Hip ABD strength to improve walking and standing andrea.   LTG Goal 4 Status[de-identified] Met    Post Treatment Pain:  0/10    Time In: 1301  Time Out: 1343  Timed Code Treatment Minutes: 42 Minutes  Total Treatment Time: 42 Minutes    Chloe STOUT Fernando, PT     Date: 11/11/2022

## 2022-11-14 ENCOUNTER — APPOINTMENT (OUTPATIENT)
Dept: PHYSICAL THERAPY | Age: 72
End: 2022-11-14
Payer: MEDICARE

## 2022-11-16 ENCOUNTER — APPOINTMENT (OUTPATIENT)
Dept: PHYSICAL THERAPY | Age: 72
End: 2022-11-16
Payer: MEDICARE

## 2022-11-18 ENCOUNTER — APPOINTMENT (OUTPATIENT)
Dept: PHYSICAL THERAPY | Age: 72
End: 2022-11-18
Payer: MEDICARE

## 2023-07-06 LAB — PROSTATE SPECIFIC AG (NG/ML) IN SER/PLAS: 5.29 NG/ML (ref 0–4)

## 2023-09-18 PROBLEM — R10.9 FLANK PAIN: Status: ACTIVE | Noted: 2023-09-18

## 2023-09-18 PROBLEM — R35.1 NOCTURIA: Status: ACTIVE | Noted: 2023-09-18

## 2023-09-18 PROBLEM — N20.0 URIC ACID KIDNEY STONE: Status: ACTIVE | Noted: 2023-09-18

## 2023-09-18 PROBLEM — N20.1 RIGHT URETERAL STONE: Status: ACTIVE | Noted: 2023-09-18

## 2023-09-18 PROBLEM — Z87.442 HISTORY OF KIDNEY STONES: Status: ACTIVE | Noted: 2023-09-18

## 2023-09-18 PROBLEM — N40.1 BENIGN PROSTATIC HYPERPLASIA WITH URINARY OBSTRUCTION AND OTHER LOWER URINARY TRACT SYMPTOMS: Status: ACTIVE | Noted: 2023-09-18

## 2023-09-18 PROBLEM — N13.8 BENIGN PROSTATIC HYPERPLASIA WITH URINARY OBSTRUCTION AND OTHER LOWER URINARY TRACT SYMPTOMS: Status: ACTIVE | Noted: 2023-09-18

## 2023-09-18 PROBLEM — R97.20 ELEVATED PSA: Status: ACTIVE | Noted: 2023-09-18

## 2023-09-18 PROBLEM — N20.0 BILATERAL RENAL STONES: Status: ACTIVE | Noted: 2023-09-18

## 2023-09-18 RX ORDER — LATANOPROST 50 UG/ML
1 SOLUTION/ DROPS OPHTHALMIC NIGHTLY
COMMUNITY

## 2023-09-18 RX ORDER — NAPROXEN SODIUM 550 MG/1
1 TABLET ORAL 2 TIMES DAILY PRN
COMMUNITY
Start: 2020-09-29

## 2023-09-18 RX ORDER — SIMVASTATIN 40 MG/1
40 TABLET, FILM COATED ORAL
COMMUNITY

## 2023-09-18 RX ORDER — METFORMIN HYDROCHLORIDE 500 MG/1
1 TABLET ORAL DAILY
COMMUNITY

## 2023-09-18 RX ORDER — HYDROCODONE BITARTRATE AND ACETAMINOPHEN 5; 325 MG/1; MG/1
1 TABLET ORAL EVERY 6 HOURS PRN
COMMUNITY
Start: 2020-10-02

## 2023-09-18 RX ORDER — LISINOPRIL 40 MG/1
1 TABLET ORAL DAILY
COMMUNITY

## 2023-09-18 RX ORDER — ATORVASTATIN CALCIUM 10 MG/1
1 TABLET, FILM COATED ORAL DAILY
COMMUNITY

## 2023-09-18 RX ORDER — ALLOPURINOL 100 MG/1
1 TABLET ORAL DAILY
COMMUNITY
Start: 2021-02-08 | End: 2024-04-18 | Stop reason: SDUPTHER

## 2023-09-18 RX ORDER — ASPIRIN 325 MG
1 TABLET, DELAYED RELEASE (ENTERIC COATED) ORAL DAILY
COMMUNITY

## 2023-10-02 ENCOUNTER — LAB (OUTPATIENT)
Dept: LAB | Facility: LAB | Age: 73
End: 2023-10-02
Payer: MEDICARE

## 2023-10-02 DIAGNOSIS — R97.20 ELEVATED PROSTATE SPECIFIC ANTIGEN (PSA): ICD-10-CM

## 2023-10-02 DIAGNOSIS — R97.20 ELEVATED PROSTATE SPECIFIC ANTIGEN (PSA): Primary | ICD-10-CM

## 2023-10-02 LAB — PSA SERPL-MCNC: 0.51 NG/ML

## 2023-10-02 PROCEDURE — 36415 COLL VENOUS BLD VENIPUNCTURE: CPT

## 2023-10-02 PROCEDURE — 84153 ASSAY OF PSA TOTAL: CPT

## 2023-10-17 ASSESSMENT — ENCOUNTER SYMPTOMS
ALLERGIC/IMMUNOLOGIC NEGATIVE: 1
PSYCHIATRIC NEGATIVE: 1
SHORTNESS OF BREATH: 0
NAUSEA: 0
FEVER: 0
ENDOCRINE NEGATIVE: 1
EYES NEGATIVE: 1
DIFFICULTY URINATING: 0
COUGH: 0
CHILLS: 0

## 2023-10-17 NOTE — PROGRESS NOTES
Subjective   Patient ID: Saman Owens is a 72 y.o. male.    HPI  Patient is here for 3 month follow up with PSA and UA.  Most recent PSA was 0.51 on 10/23.  Prior PSA was 5.29 on 7/23. Prior PSA was 0.29 on 6/22. Prior PSA was 0.53 on 5/21. Hx of Uric Acid kidney stones. No recent sx. He is taking  Allopurinol. Hx of microhematuria.  No recent workup.  Chronic BPH sx are mild and stable. Denies urgency and frequency. Denies dysuria. Denies hematuria. Nocturia x1. No medication for LUT'S.       Review of Systems   Constitutional:  Negative for chills and fever.   HENT: Negative.     Eyes: Negative.    Respiratory:  Negative for cough and shortness of breath.    Cardiovascular:  Negative for chest pain and leg swelling.   Gastrointestinal:  Negative for nausea.   Endocrine: Negative.    Genitourinary:  Negative for difficulty urinating.        Negative except for documented in HPI   Allergic/Immunologic: Negative.    Neurological:         Alert & oriented X 3   Hematological:         Denies blood thinners   Psychiatric/Behavioral: Negative.         Objective   Physical Exam  Vitals and nursing note reviewed.   Constitutional:       General: He is not in acute distress.     Appearance: Normal appearance.   Pulmonary:      Effort: Pulmonary effort is normal.   Abdominal:      Tenderness: There is no abdominal tenderness.   Genitourinary:     Comments: Kidneys non palpable bilaterally  Bladder non palpable or tender  Scrotum no mass, No hydrocele  Epididymis- No spermatocele. Non Tender.  Testicles: No mass  Urethra: No discharge  Penis within normal limits... No lesions  Prostate - deferred  Neurological:      Mental Status: He is alert.         Assessment/Plan         Diagnoses and all orders for this visit:  Benign prostatic hyperplasia with urinary obstruction and other lower urinary tract symptoms  Nocturia  History of kidney stones    All available PSA values reviewed, Options discussed. Questions  answered.  PSA returned to Baseline   Diet changes for prostate health discussed and educational information given. Pros/Cons of prostate health supplements discussed.   Treatment options for LUTS reviewed  Discussed timed voiding. Discussed fluid and caffeine intake  Treatment options for ED reviewed.  Lifestyle change to help prevent UTIs discussed. Encouraged fluid intake.  Past renal U/S reviewed  Allopurinol refilled  Stone prevention discussed. Diet reviewed. Discussed fluid intake      F/U with PSA and renal U/S

## 2023-10-18 ENCOUNTER — OFFICE VISIT (OUTPATIENT)
Dept: UROLOGY | Facility: CLINIC | Age: 73
End: 2023-10-18
Payer: MEDICARE

## 2023-10-18 VITALS — BODY MASS INDEX: 41 KG/M2 | WEIGHT: 294 LBS | RESPIRATION RATE: 16 BRPM

## 2023-10-18 DIAGNOSIS — Z87.442 HISTORY OF KIDNEY STONES: ICD-10-CM

## 2023-10-18 DIAGNOSIS — N40.1 BENIGN PROSTATIC HYPERPLASIA WITH URINARY OBSTRUCTION AND OTHER LOWER URINARY TRACT SYMPTOMS: ICD-10-CM

## 2023-10-18 DIAGNOSIS — N13.8 BENIGN PROSTATIC HYPERPLASIA WITH URINARY OBSTRUCTION AND OTHER LOWER URINARY TRACT SYMPTOMS: ICD-10-CM

## 2023-10-18 DIAGNOSIS — R35.1 NOCTURIA: ICD-10-CM

## 2023-10-18 LAB
POC APPEARANCE, URINE: CLEAR
POC BILIRUBIN, URINE: NEGATIVE
POC BLOOD, URINE: NEGATIVE
POC COLOR, URINE: YELLOW
POC GLUCOSE, URINE: NEGATIVE MG/DL
POC KETONES, URINE: NEGATIVE MG/DL
POC LEUKOCYTES, URINE: NEGATIVE
POC NITRITE,URINE: NEGATIVE
POC PH, URINE: 5.5 PH
POC PROTEIN, URINE: NEGATIVE MG/DL
POC SPECIFIC GRAVITY, URINE: 1.02
POC UROBILINOGEN, URINE: 0.2 EU/DL

## 2023-10-18 PROCEDURE — 1036F TOBACCO NON-USER: CPT | Performed by: UROLOGY

## 2023-10-18 PROCEDURE — 1159F MED LIST DOCD IN RCRD: CPT | Performed by: UROLOGY

## 2023-10-18 PROCEDURE — 99214 OFFICE O/P EST MOD 30 MIN: CPT | Performed by: UROLOGY

## 2023-10-18 PROCEDURE — 81003 URINALYSIS AUTO W/O SCOPE: CPT | Performed by: UROLOGY

## 2024-04-18 DIAGNOSIS — Z87.442 HISTORY OF KIDNEY STONES: ICD-10-CM

## 2024-04-18 RX ORDER — ALLOPURINOL 100 MG/1
100 TABLET ORAL DAILY
Qty: 30 TABLET | Refills: 11 | Status: SHIPPED | OUTPATIENT
Start: 2024-04-18 | End: 2024-05-18

## 2024-08-15 ENCOUNTER — PREP FOR PROCEDURE (OUTPATIENT)
Dept: OPERATING ROOM | Facility: HOSPITAL | Age: 74
End: 2024-08-15
Payer: MEDICARE

## 2024-08-15 DIAGNOSIS — H25.812 COMBINED FORMS OF AGE-RELATED CATARACT OF LEFT EYE: Primary | ICD-10-CM

## 2024-08-15 DIAGNOSIS — H40.1122 PRIMARY OPEN ANGLE GLAUCOMA OF LEFT EYE, MODERATE STAGE: ICD-10-CM

## 2024-08-15 RX ORDER — POVIDONE-IODINE 5 %
SOLUTION, NON-ORAL OPHTHALMIC (EYE) ONCE
OUTPATIENT
Start: 2024-08-15 | End: 2024-08-15

## 2024-08-15 RX ORDER — TETRACAINE HYDROCHLORIDE 5 MG/ML
1 SOLUTION OPHTHALMIC ONCE
OUTPATIENT
Start: 2024-08-15 | End: 2024-08-15

## 2024-08-15 RX ORDER — SODIUM CHLORIDE, SODIUM LACTATE, POTASSIUM CHLORIDE, CALCIUM CHLORIDE 600; 310; 30; 20 MG/100ML; MG/100ML; MG/100ML; MG/100ML
20 INJECTION, SOLUTION INTRAVENOUS CONTINUOUS
OUTPATIENT
Start: 2024-08-15

## 2024-08-15 RX ORDER — KETOROLAC TROMETHAMINE 5 MG/ML
1 SOLUTION OPHTHALMIC
OUTPATIENT
Start: 2024-08-15 | End: 2024-08-15

## 2024-08-15 RX ORDER — FLUOROMETHOLONE 1 MG/ML
1 SUSPENSION/ DROPS OPHTHALMIC ONCE
OUTPATIENT
Start: 2024-08-22

## 2024-08-19 PROCEDURE — 93010 ELECTROCARDIOGRAM REPORT: CPT | Performed by: INTERNAL MEDICINE

## 2024-08-19 NOTE — PREPROCEDURE INSTRUCTIONS
No outpatient medications have been marked as taking for the 8/22/24 encounter (Hospital Encounter).                       NPO Instructions:    Nothing to eat or drink after midnight    Additional Instructions:   Will need  home, will receive call day before surgery with arrival time

## 2024-08-22 ENCOUNTER — HOSPITAL ENCOUNTER (OUTPATIENT)
Facility: HOSPITAL | Age: 74
Setting detail: OUTPATIENT SURGERY
Discharge: HOME | End: 2024-08-22
Attending: OPHTHALMOLOGY | Admitting: OPHTHALMOLOGY
Payer: MEDICARE

## 2024-08-22 ENCOUNTER — ANESTHESIA EVENT (OUTPATIENT)
Dept: OPERATING ROOM | Facility: HOSPITAL | Age: 74
End: 2024-08-22
Payer: MEDICARE

## 2024-08-22 ENCOUNTER — ANESTHESIA (OUTPATIENT)
Dept: OPERATING ROOM | Facility: HOSPITAL | Age: 74
End: 2024-08-22
Payer: MEDICARE

## 2024-08-22 VITALS
BODY MASS INDEX: 41.6 KG/M2 | DIASTOLIC BLOOD PRESSURE: 88 MMHG | HEIGHT: 71 IN | SYSTOLIC BLOOD PRESSURE: 137 MMHG | WEIGHT: 297.18 LBS | OXYGEN SATURATION: 97 % | RESPIRATION RATE: 16 BRPM | HEART RATE: 58 BPM | TEMPERATURE: 97.2 F

## 2024-08-22 LAB — GLUCOSE BLD MANUAL STRIP-MCNC: 101 MG/DL (ref 74–99)

## 2024-08-22 PROCEDURE — 3700000001 HC GENERAL ANESTHESIA TIME - INITIAL BASE CHARGE: Performed by: OPHTHALMOLOGY

## 2024-08-22 PROCEDURE — 2500000001 HC RX 250 WO HCPCS SELF ADMINISTERED DRUGS (ALT 637 FOR MEDICARE OP): Performed by: OPHTHALMOLOGY

## 2024-08-22 PROCEDURE — 7100000009 HC PHASE TWO TIME - INITIAL BASE CHARGE: Performed by: OPHTHALMOLOGY

## 2024-08-22 PROCEDURE — 2760000001 HC OR 276 NO HCPCS: Performed by: OPHTHALMOLOGY

## 2024-08-22 PROCEDURE — 3600000008 HC OR TIME - EACH INCREMENTAL 1 MINUTE - PROCEDURE LEVEL THREE: Performed by: OPHTHALMOLOGY

## 2024-08-22 PROCEDURE — 7100000010 HC PHASE TWO TIME - EACH INCREMENTAL 1 MINUTE: Performed by: OPHTHALMOLOGY

## 2024-08-22 PROCEDURE — 2500000005 HC RX 250 GENERAL PHARMACY W/O HCPCS: Performed by: OPHTHALMOLOGY

## 2024-08-22 PROCEDURE — 2500000004 HC RX 250 GENERAL PHARMACY W/ HCPCS (ALT 636 FOR OP/ED): Performed by: ANESTHESIOLOGY

## 2024-08-22 PROCEDURE — 2500000004 HC RX 250 GENERAL PHARMACY W/ HCPCS (ALT 636 FOR OP/ED): Performed by: OPHTHALMOLOGY

## 2024-08-22 PROCEDURE — C1780 LENS, INTRAOCULAR (NEW TECH): HCPCS | Performed by: OPHTHALMOLOGY

## 2024-08-22 PROCEDURE — 3700000002 HC GENERAL ANESTHESIA TIME - EACH INCREMENTAL 1 MINUTE: Performed by: OPHTHALMOLOGY

## 2024-08-22 PROCEDURE — 82947 ASSAY GLUCOSE BLOOD QUANT: CPT

## 2024-08-22 PROCEDURE — C1776 JOINT DEVICE (IMPLANTABLE): HCPCS | Performed by: OPHTHALMOLOGY

## 2024-08-22 PROCEDURE — 2721000 HC OR 272 NO HCPCS: Performed by: OPHTHALMOLOGY

## 2024-08-22 PROCEDURE — 3600000003 HC OR TIME - INITIAL BASE CHARGE - PROCEDURE LEVEL THREE: Performed by: OPHTHALMOLOGY

## 2024-08-22 DEVICE — HYDRUS® MICROSTENT
Type: IMPLANTABLE DEVICE | Site: EYE | Status: FUNCTIONAL
Brand: HYDRUS® MICROSTENT

## 2024-08-22 DEVICE — ACRYSOF(R) IQ ASPHERIC NATURAL IOL, SINGLE-PIECE ACRYLIC FOLDABLE PCL, UV WITH BLUE LIGHTFILTER, 13.0MM LENGTH, 6.0MM ANTERIORASYMMETRIC BICONVEX OPTIC, PLANAR HAPTICS.
Type: IMPLANTABLE DEVICE | Site: EYE | Status: FUNCTIONAL
Brand: ACRYSOF®

## 2024-08-22 RX ORDER — SODIUM CHLORIDE, SODIUM LACTATE, POTASSIUM CHLORIDE, CALCIUM CHLORIDE 600; 310; 30; 20 MG/100ML; MG/100ML; MG/100ML; MG/100ML
20 INJECTION, SOLUTION INTRAVENOUS CONTINUOUS
Status: DISCONTINUED | OUTPATIENT
Start: 2024-08-22 | End: 2024-08-22 | Stop reason: HOSPADM

## 2024-08-22 RX ORDER — KETOROLAC TROMETHAMINE 4 MG/ML
1 SOLUTION/ DROPS OPHTHALMIC
Status: COMPLETED | OUTPATIENT
Start: 2024-08-22 | End: 2024-08-22

## 2024-08-22 RX ORDER — FENTANYL CITRATE 50 UG/ML
INJECTION, SOLUTION INTRAMUSCULAR; INTRAVENOUS AS NEEDED
Status: DISCONTINUED | OUTPATIENT
Start: 2024-08-22 | End: 2024-08-22

## 2024-08-22 RX ORDER — PROPOFOL 10 MG/ML
INJECTION, EMULSION INTRAVENOUS AS NEEDED
Status: DISCONTINUED | OUTPATIENT
Start: 2024-08-22 | End: 2024-08-22

## 2024-08-22 RX ORDER — LIDOCAINE HYDROCHLORIDE AND EPINEPHRINE 10; 10 MG/ML; UG/ML
INJECTION, SOLUTION INFILTRATION; PERINEURAL AS NEEDED
Status: DISCONTINUED | OUTPATIENT
Start: 2024-08-22 | End: 2024-08-22 | Stop reason: HOSPADM

## 2024-08-22 RX ORDER — POVIDONE-IODINE 5 %
SOLUTION, NON-ORAL OPHTHALMIC (EYE) ONCE
Status: COMPLETED | OUTPATIENT
Start: 2024-08-22 | End: 2024-08-22

## 2024-08-22 RX ORDER — MIDAZOLAM HYDROCHLORIDE 1 MG/ML
1 INJECTION INTRAMUSCULAR; INTRAVENOUS ONCE
Status: COMPLETED | OUTPATIENT
Start: 2024-08-22 | End: 2024-08-22

## 2024-08-22 RX ORDER — TETRACAINE HYDROCHLORIDE 5 MG/ML
1 SOLUTION OPHTHALMIC ONCE
Status: COMPLETED | OUTPATIENT
Start: 2024-08-22 | End: 2024-08-22

## 2024-08-22 RX ORDER — FLUOROMETHOLONE 1 MG/ML
1 SUSPENSION/ DROPS OPHTHALMIC ONCE
Status: DISCONTINUED | OUTPATIENT
Start: 2024-08-22 | End: 2024-08-22 | Stop reason: HOSPADM

## 2024-08-22 RX ADMIN — PHENYLEPHRINE HYDROCHLORIDE 1 DROP: 100 SOLUTION/ DROPS OPHTHALMIC at 06:41

## 2024-08-22 RX ADMIN — KETOROLAC TROMETHAMINE 1 DROP: 4 SOLUTION/ DROPS OPHTHALMIC at 06:33

## 2024-08-22 RX ADMIN — PHENYLEPHRINE HYDROCHLORIDE 1 DROP: 100 SOLUTION/ DROPS OPHTHALMIC at 06:52

## 2024-08-22 RX ADMIN — KETOROLAC TROMETHAMINE 1 DROP: 4 SOLUTION/ DROPS OPHTHALMIC at 06:52

## 2024-08-22 RX ADMIN — KETOROLAC TROMETHAMINE 1 DROP: 4 SOLUTION/ DROPS OPHTHALMIC at 06:25

## 2024-08-22 RX ADMIN — POVIDONE-IODINE: 5 SOLUTION OPHTHALMIC at 06:25

## 2024-08-22 RX ADMIN — POLYVINYL ALCOHOL, POVIDONE 1 DROP: 14; 6 SOLUTION/ DROPS OPHTHALMIC at 06:41

## 2024-08-22 RX ADMIN — TETRACAINE HYDROCHLORIDE 1 DROP: 5 SOLUTION OPHTHALMIC at 06:25

## 2024-08-22 RX ADMIN — POLYVINYL ALCOHOL, POVIDONE 1 DROP: 14; 6 SOLUTION/ DROPS OPHTHALMIC at 06:26

## 2024-08-22 RX ADMIN — PHENYLEPHRINE HYDROCHLORIDE 1 DROP: 100 SOLUTION/ DROPS OPHTHALMIC at 06:33

## 2024-08-22 RX ADMIN — SODIUM CHLORIDE, POTASSIUM CHLORIDE, SODIUM LACTATE AND CALCIUM CHLORIDE 20 ML/HR: 600; 310; 30; 20 INJECTION, SOLUTION INTRAVENOUS at 06:52

## 2024-08-22 RX ADMIN — KETOROLAC TROMETHAMINE 1 DROP: 4 SOLUTION/ DROPS OPHTHALMIC at 06:41

## 2024-08-22 RX ADMIN — POLYVINYL ALCOHOL, POVIDONE 1 DROP: 14; 6 SOLUTION/ DROPS OPHTHALMIC at 06:34

## 2024-08-22 RX ADMIN — PHENYLEPHRINE HYDROCHLORIDE 1 DROP: 100 SOLUTION/ DROPS OPHTHALMIC at 06:25

## 2024-08-22 RX ADMIN — POLYVINYL ALCOHOL, POVIDONE 1 DROP: 14; 6 SOLUTION/ DROPS OPHTHALMIC at 06:52

## 2024-08-22 ASSESSMENT — PAIN SCALES - GENERAL
PAINLEVEL_OUTOF10: 0 - NO PAIN
PAINLEVEL_OUTOF10: 2

## 2024-08-22 ASSESSMENT — PAIN - FUNCTIONAL ASSESSMENT: PAIN_FUNCTIONAL_ASSESSMENT: 0-10

## 2024-08-22 ASSESSMENT — COLUMBIA-SUICIDE SEVERITY RATING SCALE - C-SSRS
6. HAVE YOU EVER DONE ANYTHING, STARTED TO DO ANYTHING, OR PREPARED TO DO ANYTHING TO END YOUR LIFE?: NO
2. HAVE YOU ACTUALLY HAD ANY THOUGHTS OF KILLING YOURSELF?: NO
1. IN THE PAST MONTH, HAVE YOU WISHED YOU WERE DEAD OR WISHED YOU COULD GO TO SLEEP AND NOT WAKE UP?: NO

## 2024-08-22 NOTE — OP NOTE
PHACOEMULSIFICATION,CATARACT,WITH IOL AND GLAUCOMA STENT INSERTION (L), Canaloplasty Eye (L) Operative Note     Date: 2024  OR Location: [facility] OR    Name: Saman Owens, : 1950, Age: 73 y.o., MRN: [id number], Sex: male    Diagnosis  Pre-op Diagnosis      * Combined forms of age-related cataract of left eye [H25.812]     * Primary open angle glaucoma of left eye, moderate stage [H40.1122] Post-op Diagnosis     * Combined forms of age-related cataract of left eye [H25.812]     * Primary open angle glaucoma of left eye, moderate stage [H40.1122]     Procedures  PHACOEMULSIFICATION,CATARACT,WITH IOL AND GLAUCOMA STENT INSERTION  52006 - ME XCAPSL CTRC RMVL INSJ IO LENS PROSTH INSJ 1+    Canaloplasty Eye  49154 - ME TRLUML DILAT AQUEOUS O/F CAN WO RETENTION DEV/ST      Surgeons      * Marino Ahuja - Primary    Resident/Fellow/Other Assistant:  Surgeons and Role:  * No surgeons found with a matching role *    Procedure Summary  Anesthesia: Monitor Anesthesia Care  ASA: III  Anesthesia Staff: Anesthesiologist: Az Romero MD  Estimated Blood Loss: None  Intra-op Medications:   Administrations occurring from 0730 to 0810 on 24:   Medication Name Total Dose   lidocaine-epinephrine (Xylocaine W/EPI) 1 %-1:100,000 injection 7 mL   acetylcholine (Miochol-E) injection 20 mg   tobramycin (Tobrex) 0.3 % ophthalmic ointment 0.5 inch   lactated Ringer's infusion Cannot be calculated     Anesthesia Record        Intraprocedure I/O Totals       None      Specimen: No specimens collected     Staff:   Circulator: Samantha Roberts Person: Saran    Drains and/or Catheters: * None in log *    Tourniquet Times:     I have reviewed the images and report from the Ophthalmic Biometry 24 to determine the intraocular lens power calculation for the IOL lens implant.  I have interpreted and agree with the calculation of the IOL as listed below.      Implants:  Implants       Type Name Action Serial No.      Lens  LENS, INTRAOCULAR, SN60WF 17.0 MADIHA - [id number] - [id number] Implanted [id number]     Stent HYDRUS MICROSTENT Implanted N/A        Findings: Combined Form Age Related Cataract Left Eye, Primary Open Angle Glaucoma Left Eye, Moderate Stage    Indications: Saman Owens is an 73 y.o. male who is having surgery for Combined forms of age-related cataract of left eye [H25.812]  Primary open angle glaucoma of left eye, moderate stage [H40.1122]. Blurred vision left eye.  Difficulty seeing to read and watch TV.  Difficulty seeing to drive left eye, complains of glare and halos around lights with left eye.  Intraocular pressure not at desired target pressure with topical medications. Patient reports side effects of the medications are negatively impacting the patients quality of life.  Inability to comply with glaucoma eye drop requirements.     The patient was seen in the preoperative area. The risks, benefits, complications, treatment options, non-operative alternatives, expected recovery and outcomes were discussed with the patient. The possibilities of reaction to medication, pulmonary aspiration, injury to surrounding structures, bleeding, recurrent infection, the need for additional procedures, failure to diagnose a condition, and creating a complication requiring transfusion or operation were discussed with the patient. The patient concurred with the proposed plan, giving informed consent.  The site of surgery was properly noted/marked if necessary per policy. The patient has been actively warmed in preoperative area. Preoperative antibiotics are not indicated. Venous thrombosis prophylaxis are not indicated.    Procedure Details: The patient was correctly identified in the preop area and the operative eye was marked with a marking pen. The operative eye was dilated in the preoperative area.  The patient was then taken to the operating room where timeout was performed before starting the procedure.  Combined anesthesia  with intravenous sedation and topical tetracaine eyedrops were given the left eye. A lalita-bulbar block was given using 1% Lidocaine with Epinephrine. The operative eye was prepped and draped in the standard sterile ophthalmic fashion in  preparation for ophthalmic surgery.  A Francis wire speculum was then inserted between the eyelids of the left eye and the operating microscope was placed over the left eye.  A paracentesis incision was made approximately 30° away from the planned surgical incision site with the help of MVR blade.  1% lidocaine MPF with Phenylephrine 1.5% PF was injected into the anterior chamber through the paracentesis incision. A near limbal clear corneal incision was fashioned in the temporal quadrant just outside the vascular arcade and Viscoat was injected into anterior chamber to firm the eye. A bent needle cystotome was used and Utrata forceps were utilized to create a continuous curvilinear capsulorrhexis.  BSS was injected beneath the anterior capsule to hydrodissect the nucleus from adjacent cortex and capsule.  The residual cortex were then aspirated with irrigation aspiration handpiece. The posterior capsule was then polished with the help of soft irrigation-aspiration tip.  Provisc viscoelastic was then injected into the eye to reform the anterior chamber and to open the capsular bag.  The intraocular lens implant was taken from its sterile wrapping, inspected under the surgical microscope and found to be in good condition. The intraocular lens implant +17.0D was injected into the capsule bag. The Provisc was then aspirated from the anterior chamber and from behind the intraocular lens implant.  The anterior chamber was inflated with the help of BSS to moderate tension.       Miochol was injected into the anterior chamber.  The patient's head was turned to the right side and the microscope was tilted to the left.  The anterior chamber was entered on the temporal  side with a 1.5mm blade and Discovisc was injected into the anterior  chamber. Gonioprism was used to visualize the angle. Using the gonio-prism the nasal trabecular meshwork was brought into clear view.  The Hydrus micro-stent was placed in the nasal trabecular meshwork.  A small amount of viscoat was placed on the cornea. A small amount of viscoat was placed on the cornea.  The iTrack Advance cannula was inserted into the anterior chamber and was used to perform a nasal goniotomy.  Through this incision, the iTrack catheter was advanced into the Schlemm's Canal by advancing the actuator and the catheter navigated 360 degrees through Schlemm's canal.  Upon withdrawal of the iTrack catheter, provisc was injected into Schlemm;s canal allowing for vasodilation and focal disruptions of the trabecular platelets to allow better outflow of aqueous.  Approximately 9 clicks of viscoelastic per 3 clock hours in 360 degrees was placed inside Schlemm's canal.  The iTrack catheter was then removed from the eye and the patients head was rotated back to the neutral position.  An I & A (irrigation & aspiration) handpiece was then used to remove the Viscoat from the anterior chamber as well as any blood products that may have collected during the viscoanalostomy.      Vigamox was injected intracameral into the left eye.      At the end of the procedure the edges of the incision were hydrated by using balanced salt solution.  The anterior chamber was inflated with the help of BSS to moderate tension. The surgical incision was inspected and found to be water tight.  The wire  speculum and drapes were then removed.  Fluorometholone eye drops, Acular eye drops and Betadine 5% sterile ophthalmic solution were instilled into the conjunctival sac.   Tobrex ointment was instilled into the left eye. The eye was patched and shield was applied.      Patient will be co-managed with Optometrist.    Complications:  None; patient tolerated the  procedure well.    Disposition:  Home  Condition: stable     Attending Attestation:     Marino Ahuja  Phone Number: [phone]

## 2024-08-22 NOTE — ANESTHESIA PREPROCEDURE EVALUATION
Patient: Saman Owens    Procedure Information       Date/Time: 08/22/24 0730    Procedures:       PHACOEMULSIFICATION,CATARACT,WITH IOL AND GLAUCOMA STENT INSERTION (Left: Eye)      Canaloplasty Eye (Left: Eye)    Location: [facility] OR  / [facility] OR    Surgeons: Marino Ahuja MD            Relevant Problems   /Renal   (+) Benign prostatic hyperplasia with urinary obstruction and other lower urinary tract symptoms   (+) Bilateral renal stones   (+) Uric acid kidney stone      HEENT   (+) Primary open angle glaucoma of left eye, moderate stage       Clinical information reviewed:   Tobacco  Allergies  Meds   Med Hx  Surg Hx   Fam Hx  Soc Hx        NPO Detail:  NPO/Void Status  Carbohydrate Drink Given Prior to Surgery? : N  Date of Last Liquid: 08/21/24  Time of Last Liquid: 2100  Date of Last Solid: 08/21/24  Time of Last Solid: 2100  Last Intake Type: Clear fluids  Time of Last Void: 0637         Physical Exam    Airway  Mallampati: II  TM distance: >3 FB  Neck ROM: full     Cardiovascular   Rhythm: regular  Rate: normal     Dental    Pulmonary    Abdominal            Anesthesia Plan    History of general anesthesia?: yes  History of complications of general anesthesia?: no    ASA 3     MAC     Anesthetic plan and risks discussed with patient.

## 2024-08-22 NOTE — DISCHARGE INSTRUCTIONS
Please see enclosed instructions from Dr. Ahuja regarding eye drop schedule, restrictions and use of eye shield.    Please take bag with eye drops that were given to you today as well as ALL eye drops that you are using at home with you to your appointment tomorrow at Dr. Ahuja's office.

## 2024-08-22 NOTE — H&P
H&P Notes  - documented in this encounter   Marino Ahuja MD - 2024 1:44 PM EDT  Formatting of this note is different from the original.  HISTORY AND PHYSICAL EXAMINATION    SERVICE DATE: 2024  SERVICE TIME:    PRIMARY CARE PHYSICIAN: Ragini Matias DO    REASON FOR VISIT:  Saman Owens is a 73 year old male who is being seen for Combined form age-related cataract left eye, Primary open angle glaucoma left eye, moderate stage    The patient has the following:  ACTIVE PROBLEM LIST  Combined Forms of Age-Related Cataract of Left Eye  Combined Forms of Age-Related Cataract of Right Eye  Primary Open Angle Glaucoma (Poag) of Both Eyes, Indeterminate Stage  Type 2 Diabetes Mellitus Without Retinopathy (Hcc)  Essential Hypertension  Regular Astigmatism of Left Eye  Other Optic Atrophy, Left Eye  Ischemic Optic Neuropathy of Left Eye  Regular Astigmatism of Right Eye  Primary Open Angle Glaucoma (Poag) of Left Eye, Moderate Stage  Primary Open Angle Glaucoma (Poag) of Right Eye, Moderate Stage  Optic Atrophy, Left Eye    SUBJECTIVE  CHIEF COMPLAINT: Combined form age-related cataract left eye; Primary open angle glaucoma left eye, moderate stage  HPI: blurred vision for distance and near, difficulty reading fine print, seeing road signs is difficult, glare and starbursts around lights    PAST MEDICAL HISTORY  No date: Diabetes mellitus, non-insulin dependent (NIDDM or type II)  (HCC)  No date: Essential hypertension  No date: Hypercholesteremia  No date: Kidney stones  History reviewed. No pertinent surgical history.  History reviewed. No pertinent family history.  SOCIAL HISTORY:  Social History    Tobacco Use  Smoking status: Former  Packs/day: 0.50  Years: 2.00  Additional pack years: 0.00  Total pack years: 1.00  Types: Cigarettes  Quit date:   Years since quittin.6    MEDICATIONS:  Prior to Admission medications as of 24 1258  Medication Sig Last Dose Taking  allopurinol (ZYLOPRIM) 100  mg tablet Take 100 mg by mouth once daily. Taking Yes  metFORMIN (GLUCOPHAGE) 500 mg tablet Take 500 mg by mouth daily with dinner. Taking Yes  atorvastatin (LIPITOR) 20 mg tablet Take 20 mg by mouth once daily. Taking Yes  lisinopril (ZESTRIL) 40 mg tablet Take 40 mg by mouth once daily. Taking Yes  latanoprost (XALATAN) 0.005 % ophthalmic solution Use 1 Drop in both eyes daily at bedtime. Use at 10:30 PM Taking Yes  timolol maleate (TIMOPTIC) 0.5 % ophthalmic solution Use 1 Drop in both eyes two times a day. Use at 7 AM and 7 PM Taking Yes    No medication comments found.    CURRENT ALLERGIES: ALLERGIES  No Known Allergies    REVIEW OF SYSTEMS:  PAIN ASSESSMENT:  General: No weight loss, malaise or fevers.  Neuro: No Hx of stroke or seizures  Respiratory: No history of current cough or dyspnea, or pneumonia in the past 6 weeks. No history of respiratory/pulmonary symptoms or problems  Cardiovascular: Positive for: Hypertension  GI: No history of GI symptoms or problems. No history of esophageal varices, recent ascites, or ETOH greater than 2 drinks per day.  : No history of UTI in past 6 weeks. No history of renal failure. Not currently on or requiring dialysis. No history of  symptoms or problems.  GYN: N/A  Pregnancy: N/A  Endocrine: Diabetes Mellitus on oral agent  Hematology: No history of bleeding or clotting disorder. Pt is not taking anti-coagulation or platelet medications. No history of hematological symptoms or problems.  Oncology: No history of CA metastasis, chemo within 30 days, or radiotherapy within 90 days. Has not lost 10% of body wt in 6 months. No history of oncological symptoms or problems.  Psych: No history of psychiatric symptoms or problems.  Musculoskeletal: Joint pain  Skin: Negative for lesions, rash and itching.    PHYSICAL EXAM:  VITALS:  /90  Pulse 66        General: Alert and oriented  Skin: Normal color, no rash, no lesions.  HEENT: EOM, pupils equal, round and  reactive.  Cardiovascular: Normal S1 & S2, no rubs, murmurs or gallops. No JVD. Pulse regular.  Lungs: Normal breath sounds, no wheezes or crackles.  Abdomen: Soft, non-tender, no rigidity.  Extremities: No deformity, no edema or tenderness, no joint swelling or clubbing.  Neurological: Normal cognition and motor skills.  Pulses: Carotid and radial pulses normal +2.    Diagnostic tests reviewed for today's visit:  NA    ASSESSMENT  Medication and Non-Pharmacologic VTE Prophylaxis/Anticoagulants      VTE Prophylaxis: NA    Impression: There is no known pertinent medical condition which may affect lalita-operative course      [unfilled]  Clinical Risk Factors for Possible Cardiac Complications:  None    Patient is scheduled for a low-risk procedure.    FUNCTIONAL STATUS: Walk indoors, such as around the house (1.75 METs)    Functional Class (NYHA): N/A    [facility]: NA    PLAN  CONSULTS:  Patient does not require consults for optimization at this time.    The Following Tests/Procedures Have Been Initiated:  None    Instructions Given to Patient:  Patient given verbal and written preop instructions and voices comprehension and compliance.    SIGNATURE: Marino Ahuja MD PATIENT NAME: Saman Owens  DATE: August 14, 2024 MRN: [id number]  TIME: 1:46 PM PAGER/CONTACT #:    Electronically signed by Marino Ahuja MD at 08/14/2024 2:05 PM EDT   Source Comments  - [facility] [place]   In the event this information is protected by the Federal Confidentiality of Alcohol and Drug Abuse Patient Records regulations: This information has been disclosed to you from records protected by Federal confidentiality rules (42 CFR Part 2). The Federal rules prohibit you from making any further disclosure of this information unless further disclosure is expressly permitted by the written consent of the person to whom it pertains or as otherwise permitted by 42 CFR Part 2. A general authorization for the release of medical or other  information is NOT sufficient for this purpose. The Federal rules restrict any use of the information to criminally investigate or prosecute any alcohol or drug abuse patient.  Reason for Visit    Reason for Visit -  Reason Comments   Blurred Vision Both Eyes     Difficulty Reading Both Eyes     Glare     Glaucoma Follow Up Both eyes - moderate stage   Diabetic Retinopathy Follow Up Blood sugar 109 this morning     Encounter Details    Encounter Details  Date Type Department Care Team (Latest Contact Info) Description   08/14/2024 1:00 PM EDT Office Visit [facility] Ophthalmology   [address] [place] [address]   [phone]  Marino Ahuja MD   [address] [place] [address]   [phone] (Work)   [phone] (Fax)  Combined forms of age-related cataract of left eye (Primary Dx);  Regular astigmatism of left eye;  Combined forms of age-related cataract of right eye;  Regular astigmatism of right eye;  Primary open angle glaucoma (POAG) of right eye, moderate stage;  Primary open angle glaucoma (POAG) of left eye, moderate stage;  Ischemic optic neuropathy of left eye;  Optic atrophy, left eye;  Type 2 diabetes mellitus without retinopathy (HCC);  Essential hypertension     Social History  - documented as of this encounter   Social History  Tobacco Use Types Packs/Day Years Used Date   Smoking Tobacco: Former Cigarettes 0.5 2 1972 - 1974     Social History  Area Deprivation Index Answer Date Recorded   National Score (1-100), lower number is lower risk 70 08/14/2024   State Score (1-10), lower number is lower risk 5 08/14/2024   Data from: https://www.neighborhoodatlas.medicine.[facility].edu/. Last address used for calculation [address] [place] [address] 08/14/2024     Social History  Sex and Gender Information Value Date Recorded   Sex Assigned at Birth Not on file     Gender Identity Not on file     Sexual Orientation Not on file       Last Filed Vital Signs  - documented in this encounter   Last Filed Vital  Signs  Vital Sign Reading Time Taken Comments   Blood Pressure 164/90 08/14/2024 12:58 PM EDT     Pulse 66 08/14/2024 12:58 PM EDT     Temperature - -     Respiratory Rate - -     Oxygen Saturation - -     Inhaled Oxygen Concentration - -     Weight - -     Height - -     Body Mass Index - -       Patient Instructions  - documented in this encounter   Patient Instructions  Marino Ahuja MD - 08/14/2024 1:03 PM EDT   Formatting of this note is different from the original.  Continue:  Current Ophthalmic Meds        latanoprost (XALATAN) 0.005 % ophthalmic solution Use 1 Drop in both eyes daily at bedtime. Use at 10:30 PM  timolol maleate (TIMOPTIC) 0.5 % ophthalmic solution Use 1 Drop in both eyes two times a day. Use at 7 AM and 7 PM        Systane Complete Artificial Tears - Use 1 Drop into both eyes three times a day.    If you have any questions please contact our office at [phone].  After office hours or on the weekend, please call Dr. Ahuja on his cell phone at [phone].    Electronically signed by Marino Ahuja MD at 08/14/2024 1:41 PM EDT     Progress Notes  - documented in this encounter   Marino Ahuja MD - 08/14/2024 1:01 PM EDT  Formatting of this note is different from the original.  ASSESSMENT/PLAN:  1. Combined forms of age-related cataract of left eye - ICD9: 366.19, ICD10: H25.812 (primary diagnosis)  2. Regular astigmatism of left eye - ICD9: 367.21, ICD10: H52.222  3. Combined forms of age-related cataract of right eye - ICD9: 366.19, ICD10: H25.811  4. Regular astigmatism of right eye - ICD9: 367.21, ICD10: H52.221    Blood pressure 164/90, pulse 66.    Reviewed Dr. Wright's examination and notes    Cataract Presurgical Documentation    Cataract: Left eye (OS) then Right eye (OD)    Current Visual Acuity  Right Eye Distance CC 20/50  Left Eye Distance CC 20/400      Visual Function: Saman Owens states that the decline in vision from the cataract impedes his abilities as  listed in the HPI, as well as other activities of daily living.    Saman Owens has confirmed that he is no longer able to function adequately on a day-to-day basis because of his current visual condition.    Further, it is my medical opinion that the cataract is the primary cause, or at least a significantly contributory cause of his visual dysfunction. With uncomplicated cataract surgery and lens implantation, it is my expectation that his visual function and quality of life will improve, significantly.    The risks, benefits, alternatives, personnel and complications of cataract surgery with lens implantation were discussed with Saman Owens in detail. he appeared to understand and asked that I proceed with plans for surgery.    Upon eye examination, patient was found to have a visually significant cataract left eye . Discussed cataract surgery with patient and different intraocular lens implant options with patient: basic monofocal intraocular lens implant, Toric intraocular lens implant, and presbyopia correction intraocular lens implant. In my medical opinion, based on medical history and ocular examination, cataract surgery with intraocular lens implant will correct patient's vision and improve quality of patient's daily living activities. Patient wishes to have traditional cataract surgery with basic intraocular lens left eye 08/22/2024. Patient wishes to have cataract surgery with the option stated above. Patient understands that an intraocular lens implant does not necessarily replace the need for glasses. Patient understands that it is impossible for the surgeon to inform him/her of every possible complication that may occur. The surgeon has answered all of the patient's questions. Patient understands that if he/she has a mature or dense cataract, pseudoexfoliation cataract, or history of use of Flomax, he/she may require the use of Maluyugin Ring and/or Vision Blue during surgery. Patient understands  the risks, benefits, and alternatives to surgery.    Patient understands the need for glasses for all near and intermediate vision including reading and computer work. He/she declines monovision. He/she was offered a Toric Intraocular lens to correct astigmatism, but he/she declines the Toric Intraocular lens. Patient understands that he/she will need glasses to correct residual astigmatism at all distances after cataract surgery.    Patient to have cataract surgery with micro-stent and Canaloplasty left eye    Patient to have physical examination/EKG and BMP prior to surgery    5. Primary open angle glaucoma (POAG) of right eye, moderate stage - ICD9: 365.11, 365.72, ICD10: H40.1112  6. Primary open angle glaucoma (POAG) of left eye, moderate stage - ICD9: 365.11, 365.72, ICD10: H40.1122    Target Intraocular pressure: 12- 14mmHg  Due to the following reason/s:  Intraocular pressure not at desired target pressure with topical medications  An inability to comply with Glaucoma eye drop requirements  Patient reported that the side effects of the medications are negatively impacting the patient's quality of life  Failed medical management    Latanoprost- using for 3 to 4 years    Current Ophthalmic Meds        timolol maleate (TIMOPTIC) 0.5 % ophthalmic solution Use 1 Drop in both eyes twice daily. Use at 7 AM and 7 PM-new today  latanoprost (XALATAN) 0.005 % ophthalmic solution Use 1 Drop in both eyes daily at bedtime. Use at 10:30 PM    The nature of glaucoma was discussed, with emphasis on the non-reversible damage to the optic nerve. Treatment options and the importance of regular examinations and testing were covered in detail, as well as the consequences of non-compliance. The patient was given the opportunity to ask questions.    Patient to have cataract surgery with micro-stent/and Canaloplasty left eye then right eye    Scheduled for cataract surgery with micro-stent/ and Canaloplasty left eye on 8/22/24 at  [facility].    7. Ischemic optic neuropathy of left eye - ICD9: 377.41, ICD10: H47.012  8. Optic atrophy, left eye - ICD9: 377.10, ICD10: H47.20    Diagnosed 11 years ago  Monitor    9. Type 2 diabetes mellitus without retinopathy (HCC) - ICD9: 250.00, ICD10: E11.9    Please keep your blood sugar under good control to minimize risk of ocular complications from diabetes.    10. Essential hypertension - ICD9: 401.9, ICD10: I10    Continue care with primary care physician    I have confirmed and edited as necessary the relevant HPI, ophthalmic history, ROS, and the neuro exam findings as obtained by others. I have seen and examined Saman Owens.  I have discussed the case and the management of this patient's care with the Resident/Fellow, if applicable. I also have reviewed and agree with the assessment and plan as stated above and agree with all of its relevant components.        Marino Ahuja MD  Electronically signed by Marino Ahuja MD at 08/14/2024 2:05 PM EDT   Plan of Treatment  - documented as of this encounter   Plan of Treatment - Upcoming Encounters  Upcoming Encounters  Date Type Department Care Team (Latest Contact Info) Description   08/23/2024 8:00 AM EDT Office Visit [facility] Ophthalmology   [address] [place], [place] [id number]   [phone]  Marino Ahuja MD   [address] [place] [address]   [phone] (Work)   [phone] (Fax)  1-day cat/canaloplasty/1st     Plan of Treatment - Scheduled Procedures  Scheduled Procedures  Name Priority Associated Diagnoses Date/Time   SURGERY AT NON[facility] FACILITY   Primary open angle glaucoma (POAG) of left eye, moderate stage   Combined form of age-related cataract, left eye  08/22/2024 9:20 AM EDT     Procedures  - documented in this encounter   Procedures  Procedure Name Priority Date/Time Associated Diagnosis Comments   VISUAL FIELD 24-2 OU (BOTH EYES) Routine 08/14/2024 2:05 PM EDT Primary open angle glaucoma  (POAG) of right eye, moderate stage   Primary open angle glaucoma (POAG) of left eye, moderate stage  Results for this procedure are in the results section.    ASCAN ONLY - DIAGNOSTIC OS (LEFT EYE) Routine 08/14/2024 1:40 PM EDT Combined forms of age-related cataract of left eye  Results for this procedure are in the results section.      Imaging Results  - documented in this encounter   Table of Contents for Imaging Results   VISUAL FIELD 24-2 OU (BOTH EYES) (08/14/2024 2:05 PM EDT)   ASCAN ONLY - DIAGNOSTIC OS (LEFT EYE) (08/14/2024 1:40 PM EDT)      VISUAL FIELD 24-2 OU (BOTH EYES) (08/14/2024 2:05 PM EDT)  Imaging Results - VISUAL FIELD 24-2 OU (BOTH EYES) (08/14/2024 2:05 PM EDT)  Anatomical Region Laterality Modality       Other     Imaging Results - VISUAL FIELD 24-2 OU (BOTH EYES) (08/14/2024 2:05 PM EDT)  Narrative   08/14/2024 2:05 PM EDT   Date of Procedure  8/14/2024.    Technician Information  Imaging Technician: [email or web address].  Re-started test 2 times and still had multiple fixation losses.    Reliability  Right Eye  Poor.    Left Eye  Poor.    Interval Change  Right Eye  Initial.    Left Eye  Initial.    Notes  Multiple fixation losses Both eyes       Imaging Results - VISUAL FIELD 24-2 OU (BOTH EYES) (08/14/2024 2:05 PM EDT)  Authorizing Provider Result Type   Marino Ahuja MD OPHTHALMOLOGY     Associated Images       8/14/2024  VISUAL FIELD 24-2 OU (BOTH EYES)   Back to top of Imaging Results       ASCAN ONLY - DIAGNOSTIC OS (LEFT EYE) (08/14/2024 1:40 PM EDT)  Imaging Results - ASCAN ONLY - DIAGNOSTIC OS (LEFT EYE) (08/14/2024 1:40 PM EDT)  Anatomical Region Laterality Modality       Other     Imaging Results - ASCAN ONLY - DIAGNOSTIC OS (LEFT EYE) (08/14/2024 1:40 PM EDT)  Narrative   08/14/2024 1:40 PM EDT   Date of Procedure  8/14/2024.    Technician Information  Imaging Technician: [email or web address]    Notes  LENSTAR  Right eye:  AL 25.13 ACD 3.89 WTW 12.82  Left eye:     AL 25.25 ACD 3.89 WTW 12.63        Imaging  Results - ASCAN ONLY - DIAGNOSTIC OS (LEFT EYE) (08/14/2024 1:40 PM EDT)  Authorizing Provider Result Type   Marino Ahuja MD OPHTHALMOLOGY     Associated Images       8/14/2024  ASCAN ONLY - DIAGNOSTIC OS (LEFT EYE)   Back to top of Imaging Results  Visit Diagnoses  - documented in this encounter   Visit Diagnoses  Diagnosis   Combined forms of age-related cataract of left eye - Primary   Other and combined forms of senile cataract    Regular astigmatism of left eye   Regular astigmatism    Combined forms of age-related cataract of right eye   Other and combined forms of senile cataract    Regular astigmatism of right eye   Regular astigmatism    Primary open angle glaucoma (POAG) of right eye, moderate stage    Primary open angle glaucoma (POAG) of left eye, moderate stage    Ischemic optic neuropathy of left eye   Ischemic optic neuropathy    Optic atrophy, left eye   Optic atrophy, unspecified    Type 2 diabetes mellitus without retinopathy (HCC)   Type II or unspecified type diabetes mellitus without mention of complication, not stated as uncontrolled    Essential hypertension   Unspecified essential hypertension      Eye Exam    Eye Exam - Visual Acuity (Snellen - Linear)  Visual Acuity (Snellen - Linear)    Right eye Left eye   Dist cc 20/50 20/400   Dist ph cc J8 J12-     Eye Exam  Correction: Glasses     Eye Exam - Tonometry (Applanation, 2:04 PM)  Tonometry (Applanation, 2:04 PM)    Right eye Left eye   Pressure 18 19     Eye Exam - Pupils  Pupils    Pupils Shape APD   Right eye PERRL Round None   Left eye PERRL Round None     Eye Exam - Visual Fields  Visual Fields    Right eye Left eye     Full Full     Eye Exam - Extraocular Movement  Extraocular Movement    Right eye Left eye     Full Full     Eye Exam - Neuro/Psych  Neuro/Psych  Oriented x3: Yes   Mood/Affect: Normal      LENSTAR  Right eye: AL 25.13 ACD 3.89 WTW 12.82  Left eye: AL 25.25 ACD 3.89 WTW 12.63       Eye Exam - External Exam  External  Exam    Right eye Left eye   External Normal including orbits and preauricular lymph nodes Normal including orbits and preauricular lymph nodes     Eye Exam - Slit Lamp Exam  Slit Lamp Exam    Right eye Left eye   Lids/Lashes Skin tag upper lid, Upper lid dermatochalasis Upper lid dermatochalasis   Conjunctiva/Sclera Pinguecula White and quiet   Cornea Normal epithelium, stroma, endothelium, and tear film Normal epithelium, stroma, endothelium, and tear film   Anterior Chamber Deep and quiet Deep and quiet   Iris Round and reactive Round and reactive   Lens 2-3+ Nuclear sclerotic cataract, 2-3+ Posterior subcapsular cataract 2-3+ Nuclear sclerotic cataract, 2-3+ Posterior subcapsular cataract   Anterior Vitreous Normal Normal     Eye Exam - Fundus Exam  Fundus Exam    Right eye Left eye   Disc Normal Disc pale   C/D Ratio 0.1 0.1   Macula Normal Normal   Vessels Normal Attenuation   Periphery Normal Normal     Eye Exam - Wearing Rx  Wearing Rx    Sphere Cylinder Axis Add   Right eye -3.00 +2.75 002 +2.50   Left eye -1.00 +2.00 007 +2.50     Care Teams  - documented as of this encounter   Care Teams  Team Member Relationship Specialty Start Date End Date   Ragini Matias DO   NPI: [id number]   [address] [place] [address]   [phone] (Work)   [phone] (Fax)  PCP - General Family Medicine 7/23/24

## 2024-08-22 NOTE — ANESTHESIA POSTPROCEDURE EVALUATION
Patient: Saman Owens    Procedure Summary       Date: 08/22/24 Room / Location: [facility] OR  / [facility] OR    Anesthesia Start: 0715 Anesthesia Stop: 0750    Procedures:       PHACOEMULSIFICATION,CATARACT,WITH IOL AND GLAUCOMA STENT INSERTION (Left: Eye)      Canaloplasty Eye (Left: Eye) Diagnosis:       Combined forms of age-related cataract of left eye      Primary open angle glaucoma of left eye, moderate stage      (Combined forms of age-related cataract of left eye [H25.812])      (Primary open angle glaucoma of left eye, moderate stage [H40.1122])    Surgeons: Marino Ahuja MD Responsible Provider: Az Romero MD    Anesthesia Type: MAC ASA Status: 3            Anesthesia Type: MAC    Vitals Value Taken Time   Vitals:    08/22/24 0635   BP: 165/74   Pulse: 60   Resp: 19   Temp: 36.6 °C (97.9 °F)   SpO2: 98%       08/22/24 0755     08/22/24 0755     08/22/24 0755     08/22/24 0755     08/22/24 0755       Anesthesia Post Evaluation    Patient location during evaluation: bedside  Patient participation: complete - patient participated  Level of consciousness: sleepy but conscious  Pain management: adequate  Airway patency: patent  Cardiovascular status: acceptable  Respiratory status: acceptable  Hydration status: acceptable  Postoperative Nausea and Vomiting: none      No notable events documented.

## 2024-09-05 ENCOUNTER — PREP FOR PROCEDURE (OUTPATIENT)
Dept: OPERATING ROOM | Facility: HOSPITAL | Age: 74
End: 2024-09-05
Payer: MEDICARE

## 2024-09-05 DIAGNOSIS — H25.811 COMBINED FORMS OF AGE-RELATED CATARACT OF RIGHT EYE: Primary | ICD-10-CM

## 2024-09-05 DIAGNOSIS — H40.1112 PRIMARY OPEN ANGLE GLAUCOMA OF RIGHT EYE, MODERATE STAGE: ICD-10-CM

## 2024-09-05 RX ORDER — TETRACAINE HYDROCHLORIDE 5 MG/ML
1 SOLUTION OPHTHALMIC ONCE
OUTPATIENT
Start: 2024-09-12 | End: 2024-09-05

## 2024-09-05 RX ORDER — POVIDONE-IODINE 5 %
SOLUTION, NON-ORAL OPHTHALMIC (EYE) ONCE
OUTPATIENT
Start: 2024-09-12 | End: 2024-09-05

## 2024-09-05 RX ORDER — KETOROLAC TROMETHAMINE 5 MG/ML
1 SOLUTION OPHTHALMIC
OUTPATIENT
Start: 2024-09-12 | End: 2024-09-12

## 2024-09-05 RX ORDER — FLUOROMETHOLONE 1 MG/ML
1 SUSPENSION/ DROPS OPHTHALMIC ONCE
OUTPATIENT
Start: 2024-09-12 | End: 2024-09-05

## 2024-09-05 RX ORDER — SODIUM CHLORIDE, SODIUM LACTATE, POTASSIUM CHLORIDE, CALCIUM CHLORIDE 600; 310; 30; 20 MG/100ML; MG/100ML; MG/100ML; MG/100ML
20 INJECTION, SOLUTION INTRAVENOUS CONTINUOUS
OUTPATIENT
Start: 2024-09-12

## 2024-09-11 ENCOUNTER — ANESTHESIA EVENT (OUTPATIENT)
Dept: OPERATING ROOM | Facility: HOSPITAL | Age: 74
End: 2024-09-11
Payer: MEDICARE

## 2024-09-11 RX ORDER — HYDROMORPHONE HYDROCHLORIDE 2 MG/ML
0.5 INJECTION, SOLUTION INTRAMUSCULAR; INTRAVENOUS; SUBCUTANEOUS EVERY 5 MIN PRN
Status: CANCELLED | OUTPATIENT
Start: 2024-09-11

## 2024-09-11 RX ORDER — SODIUM CHLORIDE, SODIUM LACTATE, POTASSIUM CHLORIDE, CALCIUM CHLORIDE 600; 310; 30; 20 MG/100ML; MG/100ML; MG/100ML; MG/100ML
125 INJECTION, SOLUTION INTRAVENOUS CONTINUOUS
Status: CANCELLED | OUTPATIENT
Start: 2024-09-11

## 2024-09-11 RX ORDER — ONDANSETRON HYDROCHLORIDE 2 MG/ML
4 INJECTION, SOLUTION INTRAVENOUS
Status: CANCELLED | OUTPATIENT
Start: 2024-09-11

## 2024-09-11 RX ORDER — OXYCODONE HYDROCHLORIDE 5 MG/1
5 TABLET ORAL ONCE
Status: CANCELLED | OUTPATIENT
Start: 2024-09-11 | End: 2024-09-11

## 2024-09-12 ENCOUNTER — ANESTHESIA (OUTPATIENT)
Dept: OPERATING ROOM | Facility: HOSPITAL | Age: 74
End: 2024-09-12
Payer: MEDICARE

## 2024-09-12 ENCOUNTER — HOSPITAL ENCOUNTER (OUTPATIENT)
Facility: HOSPITAL | Age: 74
Setting detail: OUTPATIENT SURGERY
Discharge: HOME | End: 2024-09-12
Attending: OPHTHALMOLOGY | Admitting: OPHTHALMOLOGY
Payer: MEDICARE

## 2024-09-12 VITALS
HEART RATE: 63 BPM | DIASTOLIC BLOOD PRESSURE: 72 MMHG | WEIGHT: 292.77 LBS | BODY MASS INDEX: 40.99 KG/M2 | RESPIRATION RATE: 14 BRPM | OXYGEN SATURATION: 100 % | TEMPERATURE: 97.5 F | SYSTOLIC BLOOD PRESSURE: 102 MMHG

## 2024-09-12 LAB — GLUCOSE BLD MANUAL STRIP-MCNC: 108 MG/DL (ref 74–99)

## 2024-09-12 PROCEDURE — V2632 POST CHMBR INTRAOCULAR LENS: HCPCS | Performed by: OPHTHALMOLOGY

## 2024-09-12 PROCEDURE — 7100000010 HC PHASE TWO TIME - EACH INCREMENTAL 1 MINUTE: Performed by: OPHTHALMOLOGY

## 2024-09-12 PROCEDURE — 2500000004 HC RX 250 GENERAL PHARMACY W/ HCPCS (ALT 636 FOR OP/ED): Performed by: OPHTHALMOLOGY

## 2024-09-12 PROCEDURE — 3600000003 HC OR TIME - INITIAL BASE CHARGE - PROCEDURE LEVEL THREE: Performed by: OPHTHALMOLOGY

## 2024-09-12 PROCEDURE — 2720000007 HC OR 272 NO HCPCS: Performed by: OPHTHALMOLOGY

## 2024-09-12 PROCEDURE — 2500000005 HC RX 250 GENERAL PHARMACY W/O HCPCS: Performed by: ANESTHESIOLOGY

## 2024-09-12 PROCEDURE — 2760000001 HC OR 276 NO HCPCS: Performed by: OPHTHALMOLOGY

## 2024-09-12 PROCEDURE — 2500000004 HC RX 250 GENERAL PHARMACY W/ HCPCS (ALT 636 FOR OP/ED): Performed by: ANESTHESIOLOGY

## 2024-09-12 PROCEDURE — 82947 ASSAY GLUCOSE BLOOD QUANT: CPT

## 2024-09-12 PROCEDURE — 3700000001 HC GENERAL ANESTHESIA TIME - INITIAL BASE CHARGE: Performed by: OPHTHALMOLOGY

## 2024-09-12 PROCEDURE — 7100000009 HC PHASE TWO TIME - INITIAL BASE CHARGE: Performed by: OPHTHALMOLOGY

## 2024-09-12 PROCEDURE — C1889 IMPLANT/INSERT DEVICE, NOC: HCPCS | Performed by: OPHTHALMOLOGY

## 2024-09-12 PROCEDURE — 3600000008 HC OR TIME - EACH INCREMENTAL 1 MINUTE - PROCEDURE LEVEL THREE: Performed by: OPHTHALMOLOGY

## 2024-09-12 PROCEDURE — 2500000005 HC RX 250 GENERAL PHARMACY W/O HCPCS: Performed by: OPHTHALMOLOGY

## 2024-09-12 PROCEDURE — 2500000001 HC RX 250 WO HCPCS SELF ADMINISTERED DRUGS (ALT 637 FOR MEDICARE OP): Performed by: OPHTHALMOLOGY

## 2024-09-12 PROCEDURE — 3700000002 HC GENERAL ANESTHESIA TIME - EACH INCREMENTAL 1 MINUTE: Performed by: OPHTHALMOLOGY

## 2024-09-12 DEVICE — ACRYSOF(R) IQ TORIC ASTIGMATISM IOL, SINGLE-PIECE ACRYLIC FOLDABLE LENS, UV WITH BLUE LIGHTFILTER, 13.0MM LENGTH, 6.0MM BICONVEX TORICASPHERIC OPTIC, 3.00 D CYLINDER.
Type: IMPLANTABLE DEVICE | Site: EYE | Status: FUNCTIONAL
Brand: ACRYSOF®

## 2024-09-12 RX ORDER — SODIUM CHLORIDE, SODIUM LACTATE, POTASSIUM CHLORIDE, CALCIUM CHLORIDE 600; 310; 30; 20 MG/100ML; MG/100ML; MG/100ML; MG/100ML
20 INJECTION, SOLUTION INTRAVENOUS CONTINUOUS
Status: DISCONTINUED | OUTPATIENT
Start: 2024-09-12 | End: 2024-09-12 | Stop reason: HOSPADM

## 2024-09-12 RX ORDER — POVIDONE-IODINE 5 %
SOLUTION, NON-ORAL OPHTHALMIC (EYE) ONCE
Status: COMPLETED | OUTPATIENT
Start: 2024-09-12 | End: 2024-09-12

## 2024-09-12 RX ORDER — LIDOCAINE HYDROCHLORIDE AND EPINEPHRINE 10; 10 MG/ML; UG/ML
INJECTION, SOLUTION INFILTRATION; PERINEURAL AS NEEDED
Status: DISCONTINUED | OUTPATIENT
Start: 2024-09-12 | End: 2024-09-12 | Stop reason: HOSPADM

## 2024-09-12 RX ORDER — LIDOCAINE HYDROCHLORIDE 10 MG/ML
INJECTION, SOLUTION EPIDURAL; INFILTRATION; INTRACAUDAL; PERINEURAL AS NEEDED
Status: DISCONTINUED | OUTPATIENT
Start: 2024-09-12 | End: 2024-09-12

## 2024-09-12 RX ORDER — MIDAZOLAM HYDROCHLORIDE 1 MG/ML
INJECTION INTRAMUSCULAR; INTRAVENOUS AS NEEDED
Status: DISCONTINUED | OUTPATIENT
Start: 2024-09-12 | End: 2024-09-12

## 2024-09-12 RX ORDER — TETRACAINE HYDROCHLORIDE 5 MG/ML
1 SOLUTION OPHTHALMIC ONCE
Status: COMPLETED | OUTPATIENT
Start: 2024-09-12 | End: 2024-09-12

## 2024-09-12 RX ORDER — FENTANYL CITRATE 50 UG/ML
INJECTION, SOLUTION INTRAMUSCULAR; INTRAVENOUS AS NEEDED
Status: DISCONTINUED | OUTPATIENT
Start: 2024-09-12 | End: 2024-09-12

## 2024-09-12 RX ORDER — KETOROLAC TROMETHAMINE 5 MG/ML
1 SOLUTION OPHTHALMIC
Status: COMPLETED | OUTPATIENT
Start: 2024-09-12 | End: 2024-09-12

## 2024-09-12 RX ORDER — SODIUM CHLORIDE, SODIUM LACTATE, POTASSIUM CHLORIDE, CALCIUM CHLORIDE 600; 310; 30; 20 MG/100ML; MG/100ML; MG/100ML; MG/100ML
100 INJECTION, SOLUTION INTRAVENOUS CONTINUOUS
Status: DISCONTINUED | OUTPATIENT
Start: 2024-09-12 | End: 2024-09-12 | Stop reason: HOSPADM

## 2024-09-12 RX ORDER — PROPOFOL 10 MG/ML
INJECTION, EMULSION INTRAVENOUS AS NEEDED
Status: DISCONTINUED | OUTPATIENT
Start: 2024-09-12 | End: 2024-09-12

## 2024-09-12 RX ADMIN — PHENYLEPHRINE HYDROCHLORIDE 1 DROP: 100 SOLUTION/ DROPS OPHTHALMIC at 07:33

## 2024-09-12 RX ADMIN — POLYVINYL ALCOHOL, POVIDONE 1 DROP: 14; 6 SOLUTION/ DROPS OPHTHALMIC at 07:20

## 2024-09-12 RX ADMIN — POLYVINYL ALCOHOL, POVIDONE 1 DROP: 14; 6 SOLUTION/ DROPS OPHTHALMIC at 07:39

## 2024-09-12 RX ADMIN — PHENYLEPHRINE HYDROCHLORIDE 1 DROP: 100 SOLUTION/ DROPS OPHTHALMIC at 07:09

## 2024-09-12 RX ADMIN — POLYVINYL ALCOHOL, POVIDONE 1 DROP: 14; 6 SOLUTION/ DROPS OPHTHALMIC at 07:09

## 2024-09-12 RX ADMIN — SODIUM CHLORIDE, POTASSIUM CHLORIDE, SODIUM LACTATE AND CALCIUM CHLORIDE 20 ML/HR: 600; 310; 30; 20 INJECTION, SOLUTION INTRAVENOUS at 07:20

## 2024-09-12 RX ADMIN — POVIDONE-IODINE: 5 SOLUTION OPHTHALMIC at 07:10

## 2024-09-12 RX ADMIN — POLYVINYL ALCOHOL, POVIDONE 1 DROP: 14; 6 SOLUTION/ DROPS OPHTHALMIC at 07:33

## 2024-09-12 RX ADMIN — KETOROLAC TROMETHAMINE 1 DROP: 5 SOLUTION/ DROPS OPHTHALMIC at 07:20

## 2024-09-12 RX ADMIN — TETRACAINE HYDROCHLORIDE 1 DROP: 5 SOLUTION OPHTHALMIC at 07:09

## 2024-09-12 RX ADMIN — KETOROLAC TROMETHAMINE 1 DROP: 5 SOLUTION/ DROPS OPHTHALMIC at 07:33

## 2024-09-12 RX ADMIN — KETOROLAC TROMETHAMINE 1 DROP: 5 SOLUTION/ DROPS OPHTHALMIC at 07:09

## 2024-09-12 RX ADMIN — KETOROLAC TROMETHAMINE 1 DROP: 5 SOLUTION/ DROPS OPHTHALMIC at 07:39

## 2024-09-12 RX ADMIN — PHENYLEPHRINE HYDROCHLORIDE 1 DROP: 100 SOLUTION/ DROPS OPHTHALMIC at 07:20

## 2024-09-12 RX ADMIN — PHENYLEPHRINE HYDROCHLORIDE 1 DROP: 100 SOLUTION/ DROPS OPHTHALMIC at 07:39

## 2024-09-12 ASSESSMENT — PAIN - FUNCTIONAL ASSESSMENT
PAIN_FUNCTIONAL_ASSESSMENT: 0-10
PAIN_FUNCTIONAL_ASSESSMENT: 0-10

## 2024-09-12 ASSESSMENT — COLUMBIA-SUICIDE SEVERITY RATING SCALE - C-SSRS
2. HAVE YOU ACTUALLY HAD ANY THOUGHTS OF KILLING YOURSELF?: NO
1. IN THE PAST MONTH, HAVE YOU WISHED YOU WERE DEAD OR WISHED YOU COULD GO TO SLEEP AND NOT WAKE UP?: NO
6. HAVE YOU EVER DONE ANYTHING, STARTED TO DO ANYTHING, OR PREPARED TO DO ANYTHING TO END YOUR LIFE?: NO

## 2024-09-12 ASSESSMENT — PAIN SCALES - GENERAL
PAINLEVEL_OUTOF10: 0 - NO PAIN
PAINLEVEL_OUTOF10: 0 - NO PAIN

## 2024-09-12 NOTE — H&P
H&P Notes  - documented in this encounter   Marino Ahuja MD - 09/04/2024 2:06 PM EDT  Formatting of this note is different from the original.  HISTORY AND PHYSICAL EXAMINATION    SERVICE DATE: 9/4/2024  SERVICE TIME: 2:07 PM    PRIMARY CARE PHYSICIAN: Ragini Matias DO    REASON FOR VISIT:  Saman Owens is a 73 year old male who is being seen for Combined form age-related cataract right eye / Regular astigmatism right eye / Primary open angle glaucoma right eye, moderate stage    The patient has the following:  ACTIVE PROBLEM LIST  Combined Forms of Age-Related Cataract of Right Eye  Primary Open Angle Glaucoma (Poag) of Both Eyes, Indeterminate Stage  Type 2 Diabetes Mellitus Without Retinopathy (Hcc)  Essential Hypertension  Regular Astigmatism of Left Eye  Other Optic Atrophy, Left Eye  Ischemic Optic Neuropathy of Left Eye  Regular Astigmatism of Right Eye  Primary Open Angle Glaucoma (Poag) of Left Eye, Moderate Stage  Primary Open Angle Glaucoma (Poag) of Right Eye, Moderate Stage  Optic Atrophy, Left Eye    SUBJECTIVE  CHIEF COMPLAINT: Combined form age-related cataract right eye; Primary open angle glaucoma right eye, moderate stage  HPI: blurred vision for distance and near, difficulty reading fine print, seeing road signs is difficult, glare and starbursts around lights    PAST MEDICAL HISTORY  No date: Diabetes mellitus, non-insulin dependent (NIDDM or type II)  (HCC)  No date: Essential hypertension  No date: Hypercholesteremia  No date: Kidney stones  PAST SURGICAL HISTORY  08/22/2024: CANALOPLASTY W/STENT; Left  Comment: Canaloplasty with Hydrus stent  08/22/2024: REMV CATARACT EXTRACAP,INSERT LENS; Left  Comment: SN60WF +17.0 D  History reviewed. No pertinent family history.  SOCIAL HISTORY:  Social History    Tobacco Use  Smoking status: Former  Current packs/day: 0.00  Average packs/day: 0.5 packs/day for 2.0 years (1.0 ttl pk-yrs)  Types: Cigarettes  Start date: 1972  Quit date:  1974  Years since quittin.7    MEDICATIONS:  Prior to Admission medications as of 24 1400  Medication Sig Last Dose Taking  keTORolac (ACULAR) 0.5 % ophthalmic solution Use 1 Drop in the left eye four times daily for 14 days. Taking Yes  allopurinol (ZYLOPRIM) 100 mg tablet Take 100 mg by mouth once daily. Taking Yes  metFORMIN (GLUCOPHAGE) 500 mg tablet Take 500 mg by mouth daily with dinner. Taking Yes  atorvastatin (LIPITOR) 20 mg tablet Take 20 mg by mouth once daily. Taking Yes  lisinopril (ZESTRIL) 40 mg tablet Take 40 mg by mouth once daily. Taking Yes  latanoprost (XALATAN) 0.005 % ophthalmic solution Use 1 Drop in both eyes daily at bedtime. Use at 10:30 PM Taking Yes  timolol maleate (TIMOPTIC) 0.5 % ophthalmic solution Use 1 Drop in both eyes two times a day. Use at 7 AM and 7 PM Taking Yes    No medication comments found.    CURRENT ALLERGIES: ALLERGIES  No Known Allergies    REVIEW OF SYSTEMS:  PAIN ASSESSMENT:  General: No weight loss, malaise or fevers.  Neuro: No Hx of stroke or seizures  Respiratory: No history of current cough or dyspnea, or pneumonia in the past 6 weeks. No history of respiratory/pulmonary symptoms or problems  Cardiovascular: Positive for: Hypertension  GI: No history of GI symptoms or problems. No history of esophageal varices, recent ascites, or ETOH greater than 2 drinks per day.  : No history of UTI in past 6 weeks. No history of renal failure. Not currently on or requiring dialysis. No history of  symptoms or problems.  GYN: N/A  Pregnancy: N/A  Endocrine: Diabetes Mellitus on oral agent  Hematology: No history of bleeding or clotting disorder. Pt is not taking anti-coagulation or platelet medications. No history of hematological symptoms or problems.  Oncology: No history of CA metastasis, chemo within 30 days, or radiotherapy within 90 days. Has not lost 10% of body wt in 6 months. No history of oncological symptoms or problems.  Psych: No history of  psychiatric symptoms or problems.  Musculoskeletal: Joint pain  Skin: Negative for lesions, rash and itching.    PHYSICAL EXAM:  VITALS:  /90  Pulse 66        General: Alert and oriented  Skin: Normal color, no rash, no lesions.  HEENT: EOM, pupils equal, round and reactive.  Cardiovascular: Normal S1 & S2, no rubs, murmurs or gallops. No JVD. Pulse regular.  Lungs: Normal breath sounds, no wheezes or crackles.  Abdomen: Soft, non-tender, no rigidity.  Extremities: No deformity, no edema or tenderness, no joint swelling or clubbing.  Neurological: Normal cognition and motor skills.  Pulses: Carotid and radial pulses normal +2.    Diagnostic tests reviewed for today's visit:  NA    ASSESSMENT  Medication and Non-Pharmacologic VTE Prophylaxis/Anticoagulants      VTE Prophylaxis: NA    Impression: There is no known pertinent medical condition which may affect lalita-operative course      [unfilled]  Clinical Risk Factors for Possible Cardiac Complications:  None    Patient is scheduled for a low-risk procedure.    FUNCTIONAL STATUS: Walk indoors, such as around the house (1.75 METs)    Functional Class (NYHA): N/A    [facility]: NA    PLAN  CONSULTS:  Patient does not require consults for optimization at this time.    The Following Tests/Procedures Have Been Initiated:  None    Instructions Given to Patient:  Patient given verbal and written preop instructions and voices comprehension and compliance.    SIGNATURE: Marino Ahuja MD PATIENT NAME: Saman Owens  DATE: September 4, 2024 MRN: [id number]  TIME: 2:07 PM PAGER/CONTACT #:    Electronically signed by Marino Ahuja MD at 09/04/2024 2:38 PM EDT   Source Comments  - [facility]   In the event this information is protected by the Federal Confidentiality of Alcohol and Drug Abuse Patient Records regulations: This information has been disclosed to you from records protected by Federal confidentiality rules (42 CFR Part 2). The Federal rules  prohibit you from making any further disclosure of this information unless further disclosure is expressly permitted by the written consent of the person to whom it pertains or as otherwise permitted by 42 CFR Part 2. A general authorization for the release of medical or other information is NOT sufficient for this purpose. The Federal rules restrict any use of the information to criminally investigate or prosecute any alcohol or drug abuse patient.  Reason for Visit    Reason for Visit -  Reason Comments   Blurred Vision Right Eye     Difficulty Reading Right Eye     Glare Right eye     Encounter Details    Encounter Details  Date Type Department Care Team (Latest Contact Info) Description   09/04/2024 1:45 PM EDT Office Visit [facility] Ophthalmology   [address] [place] [address]   [phone]  Marino Ahuja MD   [address] [place] [address]   [phone] (Work)   [phone] (Fax)  Combined forms of age-related cataract of right eye (Primary Dx);  Regular astigmatism of right eye;  Primary open angle glaucoma (POAG) of right eye, moderate stage;  Primary open angle glaucoma (POAG) of left eye, moderate stage;  Status post cataract extraction and insertion of intraocular lens of left eye;  Ischemic optic neuropathy of left eye;  Other optic atrophy, left eye;  Type 2 diabetes mellitus without retinopathy (HCC);  Essential hypertension     Social History  - documented as of this encounter   Social History  Tobacco Use Types Packs/Day Years Used Date   Smoking Tobacco: Former Cigarettes 0.5 2 1972 - 1974     Social History  Tobacco Cessation: Counseling Given: Not Answered     Social History  Area Deprivation Index Answer Date Recorded   National Score (1-100), lower number is lower risk 70 08/14/2024   State Score (1-10), lower number is lower risk 5 08/14/2024   Data from: https://www.neighborhoodatlas.medicine.[facility].edu/. Last address used for calculation [address] 08/14/2024     Social  History  Sex and Gender Information Value Date Recorded   Sex Assigned at Birth Not on file     Gender Identity Not on file     Sexual Orientation Not on file       Last Filed Vital Signs  - documented in this encounter   Last Filed Vital Signs  Vital Sign Reading Time Taken Comments   Blood Pressure 164/90 09/04/2024 1:59 PM EDT     Pulse 66 09/04/2024 1:59 PM EDT     Temperature - -     Respiratory Rate - -     Oxygen Saturation - -     Inhaled Oxygen Concentration - -     Weight - -     Height - -     Body Mass Index - -       Patient Instructions  - documented in this encounter   Patient Instructions  Marino Ahuja MD - 09/04/2024 2:06 PM EDT   Formatting of this note is different from the original.  Current Ophthalmic Meds        keTORolac (ACULAR) 0.5 % ophthalmic solution Use 1 Drop in the left eye three times daily for 1 day then stop  latanoprost (XALATAN) 0.005 % ophthalmic solution Use 1 Drop in both eyes daily at bedtime. Use at 10:30 PM  timolol maleate (TIMOPTIC) 0.5 % ophthalmic solution Use 1 Drop in both eyes two times a day. Use at 7 AM and 7 PM        If you have any questions please contact our office at [phone].  After office hours or on the weekend, please call Dr. Ahuja on his cell phone at [phone].    Electronically signed by Marino Ahuja MD at 09/04/2024 2:06 PM EDT     Ordered Prescriptions  - documented in this encounter  Reconcile with Patient's Chart  Ordered Prescriptions  Prescription Sig Dispensed Refills Start Date End Date   keTORolac (ACULAR) 0.5 % ophthalmic solution  Use 1 Drop in the left eye three times a day for 1 day.     09/04/2024 09/05/2024     Progress Notes  - documented in this encounter   Marino Ahuja MD - 09/04/2024 2:00 PM EDT  Formatting of this note is different from the original.  ASSESSMENT/PLAN:  1. Combined forms of age-related cataract of right eye - ICD9: 366.19, ICD10: H25.811 (primary diagnosis)  2. Regular astigmatism of right  eye - ICD9: 367.21, ICD10: H52.221    Upon eye examination, patient was found to have a visually significant cataract right eye scheduled on 9/12/24. Discussed cataract surgery with patient and different intraocular lens implant options with patient: basic monofocal intraocular lens implant, Toric intraocular lens implant, and presbyopia correction intraocular lens implant. In my medical opinion, based on medical history and ocular examination, cataract surgery with intraocular lens implant will correct patient's vision and improve quality of patient's daily living activities. Patient wishes to have traditional cataract surgery with astigmatism correction by Toric Intraocular lens. Patient wishes to have cataract surgery with the option stated above. Patient understands that an intraocular lens implant does not necessarily replace the need for glasses. Patient understands that it is impossible for the surgeon to inform him/her of every possible complication that may occur. The surgeon has answered all of the patient's questions. Patient understands that if he/she has a mature or dense cataract, pseudoexfoliation cataract, or history of use of Flomax, he/she may require the use of Maluyugin Ring and/or Vision Blue during surgery. Patient understands the risks, benefits, and alternatives to surgery.    Cataract Presurgical Documentation    Cataract: Right eye (OD)    Current Visual Acuity  Right Eye Distance CC 20/50    Visual Function: Saman Owens states that the decline in vision from the cataract impedes his abilities as listed in the HPI, as well as other activities of daily living.    Saman Owens has confirmed that he is no longer able to function adequately on a day-to-day basis because of his current visual condition.    Further, it is my medical opinion that the cataract is the primary cause, or at least a significantly contributory cause of his visual dysfunction. With uncomplicated cataract surgery and  lens implantation, it is my expectation that his visual function and quality of life will improve, significantly.    The risks, benefits, alternatives, personnel and complications of cataract surgery with lens implantation were discussed with Saman Owens in detail. he appeared to understand and asked that I proceed with plans for surgery.    PHYSICAL EXAM:    Vital Signs:  Blood pressure 164/90, pulse 66.    Respiratory:  Normal breath sounds, no wheezing.    CARD:  Normal heart sounds 1 & 2, normal sinus rhythm.    3. Primary open angle glaucoma (POAG) of right eye, moderate stage - ICD9: 365.11, 365.72, ICD10: H40.1112  - Canaloplasty with Microstent right eye scheduled on 9/12/24    Target Intraocular pressure: 12-14 mmHg  Due to the following reason/s:  Intraocular pressure not at desired target pressure with topical medications  An inability to comply with Glaucoma eye drop requirements  Patient reported that the side effects of the medications are negatively impacting the patient's quality of life  Failed medical management    4. Primary open angle glaucoma (POAG) of left eye, moderate stage - ICD9: 365.11, 365.72, ICD10: H40.1122    The nature of glaucoma was discussed, with emphasis on the non-reversible damage to the optic nerve. Treatment options and the importance of regular examinations and testing were covered in detail, as well as the consequences of non-compliance. The patient was given the opportunity to ask questions.    Current Ophthalmic Meds        latanoprost (XALATAN) 0.005 % ophthalmic solution Use 1 Drop in both eyes daily at bedtime. Use at 10:30 PM  timolol maleate (TIMOPTIC) 0.5 % ophthalmic solution Use 1 Drop in both eyes two times a day. Use at 7 AM and 7 PM    5. Status post cataract extraction and insertion of intraocular lens of left eye - ICD9: V45.61, V43.1, ICD10: Z98.42, Z96.1  - Continue  Current Ophthalmic Meds        keTORolac (ACULAR) 0.5 % ophthalmic solution Use 1 Drop  in the left eye three times daily until 9/5/24    6. Ischemic optic neuropathy of left eye - ICD9: 377.41, ICD10: H47.012  7. Other optic atrophy, left eye - ICD9: 377.10, ICD10: H47.292  - Stable / Monitor    8. Type 2 diabetes mellitus without retinopathy (HCC) - ICD9: 250.00, ICD10: E11.9  - Please keep your blood sugar under good control to minimize risk of ocular complications from diabetes.  - Continue to monitor with PCP    9. Essential hypertension - ICD9: 401.9, ICD10: I10  - Continue to monitor with PCP    I have confirmed and edited as necessary the relevant HPI, ophthalmic history, ROS, and the neuro exam findings as obtained by others. I have seen and examined Saman Owens.  I have discussed the case and the management of this patient's care with the Resident/Fellow, if applicable. I also have reviewed and agree with the assessment and plan as stated above and agree with all of its relevant components.        Electronically signed by Marino Ahuja MD at 09/04/2024 2:38 PM EDT   Plan of Treatment  - documented as of this encounter   Plan of Treatment - Upcoming Encounters  Upcoming Encounters  Date Type Department Care Team (Latest Contact Info) Description   09/13/2024 10:15 AM EDT Office Visit [facility] Ophthalmology   [address] [place], [place] [id number]   [phone]  Marino Ahuja MD   [address] [place] [address]   [phone] (Work)   [phone] (Fax)  1 DAY PO RE 2ND TORIC     Plan of Treatment - Scheduled Procedures  Scheduled Procedures  Name Priority Associated Diagnoses Date/Time   SURGERY AT NON-[facility] FACILITY   Primary open angle glaucoma (POAG) of right eye, moderate stage   Combined form of age-related cataract, right eye  09/12/2024 8:05 AM EDT     Procedures  - documented in this encounter   Procedures  Procedure Name Priority Date/Time Associated Diagnosis Comments   IOL BIOMETRY W/ IOL CALC OD (RIGHT EYE) Routine 09/04/2024 2:26 PM EDT Combined forms of age-related  cataract of right eye  Results for this procedure are in the results section.      Imaging Results  - documented in this encounter   IOL BIOMETRY W/ IOL CALC OD (RIGHT EYE) (09/04/2024 2:26 PM EDT)  Imaging Results - IOL BIOMETRY W/ IOL CALC OD (RIGHT EYE) (09/04/2024 2:26 PM EDT)  Anatomical Region Laterality Modality       Other     Imaging Results - IOL BIOMETRY W/ IOL CALC OD (RIGHT EYE) (09/04/2024 2:26 PM EDT)  Narrative   09/04/2024 2:26 PM EDT   Date of Procedure  9/4/2024.    Notes  Measurements only - see Procedure Record under Scanned Documents for  signed results.       Imaging Results - IOL BIOMETRY W/ IOL CALC OD (RIGHT EYE) (09/04/2024 2:26 PM EDT)  Authorizing Provider Result Type   Marino Ahuja MD OPHTHALMOLOGY     Associated Images       9/4/2024  IOL BIOMETRY W/ IOL CALC OD (RIGHT EYE)     Visit Diagnoses  - documented in this encounter   Visit Diagnoses  Diagnosis   Combined forms of age-related cataract of right eye - Primary   Other and combined forms of senile cataract    Regular astigmatism of right eye   Regular astigmatism    Primary open angle glaucoma (POAG) of right eye, moderate stage    Primary open angle glaucoma (POAG) of left eye, moderate stage    Status post cataract extraction and insertion of intraocular lens of left eye    Ischemic optic neuropathy of left eye   Ischemic optic neuropathy    Other optic atrophy, left eye    Type 2 diabetes mellitus without retinopathy (HCC)   Type II or unspecified type diabetes mellitus without mention of complication, not stated as uncontrolled    Essential hypertension   Unspecified essential hypertension      Discontinued Medications  - documented as of this encounter   Discontinued Medications  Medication Sig Discontinue Reason Start Date End Date   keTORolac (ACULAR) 0.5 % ophthalmic solution  Use 1 Drop in the left eye four times daily for 14 days.   08/22/2024 09/04/2024     Eye Exam    Eye Exam - Visual Acuity (Snellen -  Linear)  Visual Acuity (Snellen - Linear)    Right eye Left eye   Dist sc   20/60   Dist cc 20/50     Near cc J8       Eye Exam  Correction: Glasses     Eye Exam - Tonometry (Applanation, 2:32 PM)  Tonometry (Applanation, 2:32 PM)    Right eye Left eye   Pressure 15 15     Eye Exam - Pupils  Pupils    Shape APD   Right eye Round None   Left eye Round 1-2+     Eye Exam - Visual Fields (Counting fingers)  Visual Fields (Counting fingers)    Right eye Left eye     Full Full     Eye Exam - Extraocular Movement  Extraocular Movement    Right eye Left eye     Full Full     Eye Exam - Neuro/Psych  Neuro/Psych  Oriented x3: Yes   Mood/Affect: Normal      LENSTAR  Right eye: AL 25.16 ACD 3.90 WTW 12.76           Eye Exam - External Exam  External Exam    Right eye Left eye   External Normal including orbits and preauricular lymph nodes Normal including orbits and preauricular lymph nodes     Eye Exam - Slit Lamp Exam  Slit Lamp Exam    Right eye Left eye   Lids/Lashes Skin tag upper lid, Upper lid dermatochalasis Upper lid dermatochalasis   Conjunctiva/Sclera Pinguecula White and quiet   Cornea Normal epithelium, stroma, endothelium, and tear film Normal epithelium, stroma, endothelium, and tear film   Anterior Chamber Deep and quiet Deep and quiet   Iris Round and reactive Round and reactive   Lens 3+ Nuclear sclerotic cataract, 3+ Posterior subcapsular cataract Posterior chamber intraocular lens   Anterior Vitreous Normal Normal     Eye Exam - Fundus Exam  Fundus Exam    Right eye Left eye   Disc Normal Disc pale   C/D Ratio 0.1 0.1   Macula Normal Normal   Vessels Normal Attenuation   Periphery Normal Normal     Eye Exam - Wearing Rx  Wearing Rx    Sphere Cylinder Axis Add   Right eye -3.00 +2.75 002 +2.50   Left eye             Care Teams  - documented as of this encounter   Care Teams  Team Member Relationship Specialty Start Date End Date   Ragini Matias DO   NPI: [id number]   [address] [place] [address]   [phone] (Work)   [phone] (Fax)  PCP - General Family Medicine 7/23/24

## 2024-09-12 NOTE — ANESTHESIA PREPROCEDURE EVALUATION
Patient: Saman Owens    Procedure Information       Anesthesia Start Date/Time: 09/12/24 0904    Procedures:       PHACOEMULSIFICATION,CATARACT,WITH IOL AND GLAUCOMA STENT INSERTION (Right: Eye)      Canaloplasty Eye (Right: Eye)    Location: [facility] OR  / [facility] OR    Surgeons: Marino Ahuja MD            Relevant Problems   /Renal   (+) Benign prostatic hyperplasia with urinary obstruction and other lower urinary tract symptoms   (+) Bilateral renal stones   (+) Uric acid kidney stone      HEENT   (+) Primary open angle glaucoma of left eye, moderate stage   (+) Primary open angle glaucoma of right eye, moderate stage       Clinical information reviewed:   Tobacco  Allergies  Meds  Problems  Med Hx  Surg Hx   Fam Hx  Soc   Hx        NPO Detail:  NPO/Void Status  Carbohydrate Drink Given Prior to Surgery? : N  Date of Last Liquid: 09/11/24  Time of Last Liquid: 2200  Date of Last Solid: 09/11/24  Time of Last Solid: 1800  Last Intake Type: Clear fluids  Time of Last Void: 0530         PHYSICAL EXAM    Anesthesia Plan    History of general anesthesia?: yes  History of complications of general anesthesia?: no    ASA 3     MAC

## 2024-09-12 NOTE — ANESTHESIA POSTPROCEDURE EVALUATION
Patient: Saman Owens    Procedure Summary       Date: 08/22/24 Room / Location: [facility] OR  / [facility] OR    Anesthesia Start: 0715 Anesthesia Stop: 0750    Procedures:       PHACOEMULSIFICATION,CATARACT,WITH IOL AND GLAUCOMA STENT INSERTION (Left: Eye)      Canaloplasty Eye (Left: Eye) Diagnosis:       Combined forms of age-related cataract of left eye      Primary open angle glaucoma of left eye, moderate stage      (Combined forms of age-related cataract of left eye [H25.812])      (Primary open angle glaucoma of left eye, moderate stage [H40.1122])    Surgeons: Marino Ahuja MD Responsible Provider: Az Romero MD    Anesthesia Type: MAC ASA Status: 3            Anesthesia Type: No value filed.    Vitals Value Taken Time   Vitals:    09/12/24 0715   BP: 145/67   Pulse: 66   Resp: 14   Temp: 36.6 °C (97.8 °F)   SpO2: 95%       08/22/24 0755     08/22/24 0755     08/22/24 0755     08/22/24 0755     08/22/24 0755       Anesthesia Post Evaluation    Patient location during evaluation: bedside  Patient participation: complete - patient participated  Level of consciousness: sleepy but conscious  Pain management: adequate  Airway patency: patent  Cardiovascular status: acceptable  Respiratory status: acceptable  Hydration status: acceptable  Postoperative Nausea and Vomiting: none      No notable events documented.

## 2024-09-12 NOTE — OP NOTE
PHACOEMULSIFICATION,CATARACT,WITH IOL  (R), Canaloplasty Eye (R) Operative Note     Date: 2024  OR Location: [facility] OR    Name: Saman Owens, : 1950, Age: 73 y.o., MRN: [id number], Sex: male    Diagnosis  Pre-op Diagnosis      * Combined form of age-related cataract, right eye [H25.811]     * Primary open angle glaucoma of right eye, moderate stage [H40.1112]     * Regular astigmatism of right eye [H52.221] Post-op Diagnosis     * Combined forms of age-related cataract of right eye [H25.811]     * Primary open angle glaucoma of right eye, moderate stage [H40.1112]     * Regular astigmatism of right eye [H52.221]     Procedures  PHACOEMULSIFICATION,CATARACT,WITH intraocular lens (IOL) right eye - 86826    Canaloplasty Eye  96243 - NM TRLUML DILAT AQUEOUS O/F CAN WO RETENTION DEV/ST    Surgeons      * Marino Ahuja - Primary    Resident/Fellow/Other Assistant:  Surgeons and Role:  * No surgeons found with a matching role *    Procedure Summary  Anesthesia: Monitor Anesthesia Care  ASA: ASA status not filed in the log.  Anesthesia Staff: Anesthesiologist: Gregg Underwood MD PhD  Estimated Blood Loss: None  Intra-op Medications:   Administrations occurring from 0840 to 0920 on 24:   Medication Name Total Dose   lidocaine-epinephrine (Xylocaine W/EPI) 1 %-1:100,000 injection 5 mL   acetylcholine (Miochol-E) injection 20 mg     Anesthesia Record        Intraprocedure I/O Totals       None      Specimen: No specimens collected     Staff:   Circulator: Omid Roberts Person: Wei    Drains and/or Catheters: * None in log *    I have reviewed the images and report from the Ophthalmic Biometry 24 to determine the intraocular lens power calculation for the IOL lens implant.  I have interpreted and agree with the calculation of the IOL as listed below.    Implants:  Implants       Type Name Action Serial No.      Lens LENS, INTRAOCULAR [id number] 17.0 - [id number] - [id number] Implanted  [id number]        Findings: Combined Form Age Related Cataract Right Eye, Primary Open Angle Glaucoma Right Eye, Moderate Stage    Indications: Saman Owens is an 73 y.o. male who is having surgery for Combined forms of age-related cataract of right eye [H25.811]  Primary open angle glaucoma of right eye, moderate stage [H40.1112]. Blurred vision right eye.  Difficulty seeing for near and distance right eye.  Difficulty seeing to drive due to vision right eye, complains of glare right eye.  Difficulty seeing to read and watch TV right eye.  Intraocular pressure not at desired target pressure with topical medications.  Patient has inability to comply with glaucoma eye drop requirements. Patient reported that the side effects of the medications are negatively impacting the patient's quality of life. Failed medical management.      The patient was seen in the preoperative area. The risks, benefits, complications, treatment options, non-operative alternatives, expected recovery and outcomes were discussed with the patient. The possibilities of reaction to medication, pulmonary aspiration, injury to surrounding structures, bleeding, recurrent infection, the need for additional procedures, failure to diagnose a condition, and creating a complication requiring transfusion or operation were discussed with the patient. The patient concurred with the proposed plan, giving informed consent.  The site of surgery was properly noted/marked if necessary per policy. The patient has been actively warmed in preoperative area. Preoperative antibiotics are not indicated. Venous thrombosis prophylaxis are not indicated.    Procedure Details: The patient was correctly identified in the pre-op area and the operative eye was marked.  The operative eye was dilated in the pre-op area. Under the slit lamp the operative eye was marked using a fine marking pen at 3 o'clock, 6 o'clock and 9 o'clock  at the limbus.      The patient was  taken to the operating room and time out was performed before starting the procedure.  Combined anesthesia and IV sedation and topical Tetracaine eye drops were given. A peribulbar block was given using 5ml of 1% Lidocaine with Epinephrine.  The operative eye was prepped and draped in the standard sterile ophthalmic fashion in preparation for ophthalmic surgery.  A Francis wire speculum was then placed between the eyelids and the operative microscope was placed over the operative eye.   A paracentesis incision was made approximately 30 degrees away from the planned surgical incision site with the help of the MVR blade. Trypan blue was injected into the anterior chamber to stain the trabecular meshwork to aid in visualization, after 2  minutes the trypan blue was irrigated out.  1% Lidocaine MPF with Phenylephrine 1.5% PF was injected into the anterior chamber through the paracentesis incision.  A near limbal clear corneal incision was fashioned in the temporal quadrant just outside  the vascular arcade.  Viscoat was injected in the anterior chamber to firm the eye.  A bent needle cystotome and Utrata forceps were used to create a continuous curvilinear capsulorhexis.  Balanced salt solution was injected beneath the anterior capsule  to hydrodissect the nucleus free from adjacent cortex and capsule.  The nucleus of the cataractous lens was removed with phacoemulsification instrument.  The residual cortex was aspirated with the irrigation/aspiration hand piece.  The posterior capsule  was then polished with the help of soft irrigation/aspiration tip.  Provisc viscoelastic was then injected into the eye to reform the anterior chamber and to open the capsular bag.  The Toric Intraocular lens implant was taken from the sterile wrapping, inspected  under the surgical microscope and found to be in good condition.  The intraocular lens implant with the power of +17.0D T5 was injected into the capsular bag. The intraocular  lens was rotated to the proper axis of 05.  The viscoelastic material was aspirated from the anterior chamber and from behind the lens optics.   The anterior chamber was inflated with the help of BSS to moderate tension.      Miochol was instilled into the anterior chamber.   The anterior chamber was inflated with Discovisc and the patients head was rotated to the left side and  the microscope was rotated to the right.  Using the gonio-prism the nasal trabecular meshwork was brought into clear view.   A small amount of viscoat was placed on the cornea. The iTrack Advance cannula was inserted into the anterior chamber and was used to perform a nasal goniotomy. Through this incision, the iTrack catheter was advanced into the Schlemm's Canal by advancing the actuator and the catheter navigated 180 degrees through Schlemm's canal. Upon withdrawal of the iTrack catheter, provisc was injected into Schlemm;s canal allowing for vasodilation and focal disruptions of the trabecular platelets to allow better outflow of aqueous. Approximately 9 clicks of viscoelastic per 3 clock hours in 180 degrees was placed inside Schlemm's canal. The iTrack catheter was then removed from the eye and the patients head was rotated back to the neutral position. An I & A (irrigation & aspiration) handpiece was then used to remove the Viscoat from the anterior chamber as well as any blood products that may have collected during the viscoanalostomy.      There was bleeding into the anterior chamber which was irrigated out with the help of the I and A handpiece.      The anterior chamber was formed with BSS.  Vigamox was injected into the anterior chamber and into the capsular bag through the paracentesis incision.  The surgical incision was inspected and found to be water tight.  The wire speculum and the drapes  were removed.  Fluorometholone eye drops, Acular eye drops and Betadine 5% sterile ophthalmic solution were instilled into the  conjunctival sac. Tobrex ointment was instilled into the left eye.      The eye was patched and a shield was applied. The patient tolerated the procedure well and was taken to the recovery room in stable condition.    Patient will be co-managed with Optometrist.     Complications:  None; patient tolerated the procedure well.    Disposition:  Home  Condition: stable     Attending Attestation: I performed the procedure.    Marino Ahuja  Phone Number: [phone]

## 2024-10-18 ENCOUNTER — HOSPITAL ENCOUNTER (OUTPATIENT)
Dept: RADIOLOGY | Facility: HOSPITAL | Age: 74
Discharge: HOME | End: 2024-10-18
Payer: MEDICARE

## 2024-10-18 DIAGNOSIS — Z87.442 HISTORY OF KIDNEY STONES: ICD-10-CM

## 2024-10-18 PROCEDURE — 76770 US EXAM ABDO BACK WALL COMP: CPT

## 2024-10-29 ASSESSMENT — ENCOUNTER SYMPTOMS
NAUSEA: 0
SHORTNESS OF BREATH: 0
FEVER: 0
ALLERGIC/IMMUNOLOGIC NEGATIVE: 1
CHILLS: 0
EYES NEGATIVE: 1
COUGH: 0
PSYCHIATRIC NEGATIVE: 1
DIFFICULTY URINATING: 0
ENDOCRINE NEGATIVE: 1

## 2024-10-29 NOTE — PROGRESS NOTES
Subjective   Patient ID: Saman Owens is a 73 y.o. male.    HPI  Patient is here for yearly follow up with Renal US results. Hx of Uric Acid kidney stones. No recent sx. He is taking  Allopurinol. Recent US on 10/24 showed bilateral sub centimeter stones. Hx of microhematuria. No recent workup but last UA was negative.. Most recent PSA was. Prior PSA was 0.51 on 10/23. Chronic BPH sx are mild and stable. Denies urgency and frequency. Denies dysuria. Denies hematuria. Nocturia x1. No medication for LUT'S.       Review of Systems   Constitutional:  Negative for chills and fever.   HENT: Negative.     Eyes: Negative.    Respiratory:  Negative for cough and shortness of breath.    Cardiovascular:  Negative for chest pain and leg swelling.   Gastrointestinal:  Negative for nausea.   Endocrine: Negative.    Genitourinary:  Negative for difficulty urinating.        Negative except for documented in HPI   Allergic/Immunologic: Negative.    Neurological:         Alert & oriented X 3   Hematological:         Denies blood thinners   Psychiatric/Behavioral: Negative.         Objective   Physical Exam  Vitals and nursing note reviewed.   Constitutional:       General: He is not in acute distress.     Appearance: Normal appearance.   Pulmonary:      Effort: Pulmonary effort is normal.   Abdominal:      Tenderness: There is no abdominal tenderness.   Genitourinary:     Comments: Kidneys non palpable bilaterally  Bladder non palpable or tender  Scrotum no mass, No hydrocele  Epididymis- No spermatocele. Non Tender.  Testicles: No mass. Symmetric  Urethra: No discharge  Penis within normal limits... No lesions.no phimosis  Prostate - symmetric, no nodules. BENIGN  Seminal Vesicals: No mass.  Sphincter tone: normal  Neurological:      Mental Status: He is alert.         Assessment/Plan   Diagnoses and all orders for this visit:  Benign prostatic hyperplasia with urinary obstruction and other lower urinary tract  symptoms  Nocturia  History of kidney stones  -     allopurinol (Zyloprim) 100 mg tablet; Take 1 tablet (100 mg) by mouth once daily.    U/S reviewed  Discussed Tx of stones. No Sx and No Hydro so will observe  Allopurinol Rx given  Stone prevention discussed. Diet reviewed. Discussed fluid intake  All available PSA values reviewed, Options discussed. Questions answered.  PSA ordered   Diet changes for prostate health discussed and educational information given. Pros/Cons of prostate health supplements discussed.   Treatment options for LUTS reviewed  Discussed timed voiding. Discussed fluid and caffeine intake  Treatment options for ED reviewed-Observe  Lifestyle change to help prevent UTIs discussed. Encouraged fluid intake.    F/U 1 year with renal U/S and PSA

## 2024-11-06 ENCOUNTER — APPOINTMENT (OUTPATIENT)
Dept: UROLOGY | Facility: CLINIC | Age: 74
End: 2024-11-06
Payer: MEDICARE

## 2024-11-06 VITALS
HEART RATE: 67 BPM | BODY MASS INDEX: 41.3 KG/M2 | DIASTOLIC BLOOD PRESSURE: 81 MMHG | WEIGHT: 295 LBS | SYSTOLIC BLOOD PRESSURE: 148 MMHG

## 2024-11-06 DIAGNOSIS — Z87.442 HISTORY OF KIDNEY STONES: Primary | ICD-10-CM

## 2024-11-06 DIAGNOSIS — N13.8 BENIGN PROSTATIC HYPERPLASIA WITH URINARY OBSTRUCTION AND OTHER LOWER URINARY TRACT SYMPTOMS: ICD-10-CM

## 2024-11-06 DIAGNOSIS — R35.1 NOCTURIA: ICD-10-CM

## 2024-11-06 DIAGNOSIS — N40.1 BENIGN PROSTATIC HYPERPLASIA WITH URINARY OBSTRUCTION AND OTHER LOWER URINARY TRACT SYMPTOMS: ICD-10-CM

## 2024-11-06 PROCEDURE — 99214 OFFICE O/P EST MOD 30 MIN: CPT | Performed by: UROLOGY

## 2024-11-06 PROCEDURE — 1159F MED LIST DOCD IN RCRD: CPT | Performed by: UROLOGY

## 2024-11-06 PROCEDURE — 1036F TOBACCO NON-USER: CPT | Performed by: UROLOGY

## 2024-11-06 RX ORDER — TIMOLOL MALEATE 5 MG/ML
SOLUTION/ DROPS OPHTHALMIC
COMMUNITY
Start: 2024-07-23

## 2024-11-06 RX ORDER — ALLOPURINOL 100 MG/1
100 TABLET ORAL DAILY
Qty: 90 TABLET | Refills: 3 | Status: SHIPPED | OUTPATIENT
Start: 2024-11-06 | End: 2025-11-06

## 2024-11-06 RX ORDER — ALLOPURINOL 100 MG/1
100 TABLET ORAL DAILY
COMMUNITY
End: 2024-11-06 | Stop reason: SDUPTHER

## 2025-01-29 LAB — PSA SERPL-MCNC: 0.51 NG/ML

## 2025-11-10 ENCOUNTER — APPOINTMENT (OUTPATIENT)
Dept: UROLOGY | Facility: CLINIC | Age: 75
End: 2025-11-10
Payer: MEDICARE

## (undated) DEVICE — NEEDLE, RETROBULBAR 25GA X 1 1/4"

## (undated) DEVICE — Device

## (undated) DEVICE — PAD, EYE, OVAL, 1 5/8 X 2 5/8 IN, STERILE

## (undated) DEVICE — SHIELD, EYE, UNIVERSAL, CLEAR

## (undated) DEVICE — SYRINGE ONLY, SYRINGE, 3ML, S/T, 10 ML GRADUATION

## (undated) DEVICE — TOWEL PACK, STERILE, 4/PACK, BLUE

## (undated) DEVICE — NEEDLE, HYPODERMIC, REGULAR WALL, REGULAR BEVEL, 30 G X 0.5 IN

## (undated) DEVICE — ITRACK

## (undated) DEVICE — NEEDLE, HYPODERMIC, REGULAR WALL, REGULAR BEVEL, 18 G X 1 IN

## (undated) DEVICE — EYE SHIELD LENSES IN DISPENSER BOX

## (undated) DEVICE — GLOVE, PROTEXIS PI CLASSIC, SZ-8.0, PF, PF, LF

## (undated) DEVICE — IPRISM S, SINGLE USE GONIOSCOPIC - LEFT